# Patient Record
Sex: MALE | Race: WHITE | Employment: OTHER | URBAN - METROPOLITAN AREA
[De-identification: names, ages, dates, MRNs, and addresses within clinical notes are randomized per-mention and may not be internally consistent; named-entity substitution may affect disease eponyms.]

---

## 2022-08-02 ENCOUNTER — OFFICE VISIT (OUTPATIENT)
Dept: PRIMARY CARE CLINIC | Age: 52
End: 2022-08-02
Payer: MEDICAID

## 2022-08-02 VITALS
HEIGHT: 74 IN | HEART RATE: 83 BPM | DIASTOLIC BLOOD PRESSURE: 82 MMHG | SYSTOLIC BLOOD PRESSURE: 126 MMHG | OXYGEN SATURATION: 98 % | BODY MASS INDEX: 35.24 KG/M2 | WEIGHT: 274.6 LBS

## 2022-08-02 DIAGNOSIS — F41.9 ANXIETY: ICD-10-CM

## 2022-08-02 DIAGNOSIS — Z12.5 SCREENING FOR PROSTATE CANCER: ICD-10-CM

## 2022-08-02 DIAGNOSIS — E11.8 CONTROLLED TYPE 2 DIABETES MELLITUS WITH COMPLICATION, WITHOUT LONG-TERM CURRENT USE OF INSULIN (HCC): Primary | ICD-10-CM

## 2022-08-02 DIAGNOSIS — M54.12 CERVICAL RADICULOPATHY: ICD-10-CM

## 2022-08-02 DIAGNOSIS — M51.26 HERNIATED LUMBAR INTERVERTEBRAL DISC: ICD-10-CM

## 2022-08-02 DIAGNOSIS — I10 ESSENTIAL (PRIMARY) HYPERTENSION: ICD-10-CM

## 2022-08-02 DIAGNOSIS — Z12.11 SCREENING FOR MALIGNANT NEOPLASM OF COLON: ICD-10-CM

## 2022-08-02 DIAGNOSIS — G47.33 OSA (OBSTRUCTIVE SLEEP APNEA): ICD-10-CM

## 2022-08-02 LAB — HBA1C MFR BLD: 5.6 %

## 2022-08-02 PROCEDURE — 3044F HG A1C LEVEL LT 7.0%: CPT | Performed by: PHYSICIAN ASSISTANT

## 2022-08-02 PROCEDURE — 99204 OFFICE O/P NEW MOD 45 MIN: CPT | Performed by: PHYSICIAN ASSISTANT

## 2022-08-02 PROCEDURE — 83036 HEMOGLOBIN GLYCOSYLATED A1C: CPT | Performed by: PHYSICIAN ASSISTANT

## 2022-08-02 RX ORDER — AMLODIPINE BESYLATE 5 MG/1
10 TABLET ORAL DAILY
Qty: 30 TABLET | Refills: 2 | Status: SHIPPED | OUTPATIENT
Start: 2022-08-02 | End: 2022-08-02

## 2022-08-02 RX ORDER — LISINOPRIL 20 MG/1
20 TABLET ORAL DAILY
COMMUNITY
End: 2022-08-02 | Stop reason: SDUPTHER

## 2022-08-02 RX ORDER — CITALOPRAM 20 MG/1
20 TABLET ORAL DAILY
Qty: 30 TABLET | Refills: 2 | Status: SHIPPED | OUTPATIENT
Start: 2022-08-02 | End: 2022-08-02

## 2022-08-02 RX ORDER — CITALOPRAM 20 MG/1
20 TABLET ORAL DAILY
COMMUNITY
End: 2022-08-02 | Stop reason: SDUPTHER

## 2022-08-02 RX ORDER — AMLODIPINE BESYLATE 5 MG/1
5 TABLET ORAL DAILY
COMMUNITY
End: 2022-08-02 | Stop reason: SDUPTHER

## 2022-08-02 RX ORDER — AMLODIPINE BESYLATE 10 MG/1
10 TABLET ORAL DAILY
Qty: 30 TABLET | Refills: 1 | Status: SHIPPED | OUTPATIENT
Start: 2022-08-02 | End: 2022-09-27

## 2022-08-02 RX ORDER — LISINOPRIL 20 MG/1
20 TABLET ORAL DAILY
Qty: 30 TABLET | Refills: 1 | Status: SHIPPED | OUTPATIENT
Start: 2022-08-02 | End: 2022-09-27

## 2022-08-02 RX ORDER — CITALOPRAM 40 MG/1
40 TABLET ORAL DAILY
Qty: 30 TABLET | Refills: 1 | Status: SHIPPED | OUTPATIENT
Start: 2022-08-02 | End: 2022-09-27

## 2022-08-02 SDOH — ECONOMIC STABILITY: FOOD INSECURITY: WITHIN THE PAST 12 MONTHS, YOU WORRIED THAT YOUR FOOD WOULD RUN OUT BEFORE YOU GOT MONEY TO BUY MORE.: NEVER TRUE

## 2022-08-02 SDOH — ECONOMIC STABILITY: FOOD INSECURITY: WITHIN THE PAST 12 MONTHS, THE FOOD YOU BOUGHT JUST DIDN'T LAST AND YOU DIDN'T HAVE MONEY TO GET MORE.: NEVER TRUE

## 2022-08-02 ASSESSMENT — ENCOUNTER SYMPTOMS
EYE DISCHARGE: 0
DIARRHEA: 0
BACK PAIN: 1
RHINORRHEA: 0
SINUS PRESSURE: 0
COUGH: 0
SORE THROAT: 0
SHORTNESS OF BREATH: 0
PHOTOPHOBIA: 0
VOMITING: 0
CHEST TIGHTNESS: 0
CONSTIPATION: 0
ABDOMINAL PAIN: 0
ABDOMINAL DISTENTION: 0

## 2022-08-02 ASSESSMENT — PATIENT HEALTH QUESTIONNAIRE - PHQ9
SUM OF ALL RESPONSES TO PHQ9 QUESTIONS 1 & 2: 0
SUM OF ALL RESPONSES TO PHQ QUESTIONS 1-9: 0
1. LITTLE INTEREST OR PLEASURE IN DOING THINGS: 0
SUM OF ALL RESPONSES TO PHQ QUESTIONS 1-9: 0
SUM OF ALL RESPONSES TO PHQ QUESTIONS 1-9: 0
2. FEELING DOWN, DEPRESSED OR HOPELESS: 0
SUM OF ALL RESPONSES TO PHQ QUESTIONS 1-9: 0

## 2022-08-02 ASSESSMENT — SOCIAL DETERMINANTS OF HEALTH (SDOH): HOW HARD IS IT FOR YOU TO PAY FOR THE VERY BASICS LIKE FOOD, HOUSING, MEDICAL CARE, AND HEATING?: NOT HARD AT ALL

## 2022-08-02 NOTE — PROGRESS NOTES
[facility] PRIMARY CARE  [address] [facility] [place] [address]  Dept: [phone]    Damon Anand is a 47 y.o. male new patient who presents today for his medical conditions/complaints as noted below. Chief Complaint   Patient presents with    New Patient    Back Pain       HPI:     Back Pain  Pertinent negatives include no abdominal pain, chest pain, fever, headaches, numbness or weakness. The patient is diagnosed with Diabetes using metformin and had a1c around 6. Has had knee replaced on right side. Left knee supposed to be replaced. Did MRI of spine wanted surgery on spinal lesion in cervical spine. Chronic pain with sciatica. Was on pain medications which did not help there. Weaned off since march 14th of last year. Reviewed prior notes None  Reviewed previous Labs    No results found for: LDLCHOLESTEROL, LDLCALC    (goal LDL is <100)   Hemoglobin A1C (%)   Date Value   08/02/2022 5.6     BP Readings from Last 3 Encounters:   08/02/22 (!) 146/100          (goal 120/80)    No past medical history on file. No past surgical history on file. No family history on file. Social History     Tobacco Use    Smoking status: Not on file    Smokeless tobacco: Not on file   Substance Use Topics    Alcohol use: Not on file      Current Outpatient Medications   Medication Sig Dispense Refill    metFORMIN (GLUCOPHAGE) 500 MG tablet Take 1 tablet by mouth in the morning and 1 tablet in the evening. Take with meals. 60 tablet 1    lisinopril (PRINIVIL;ZESTRIL) 20 MG tablet Take 1 tablet by mouth in the morning. 30 tablet 1    amLODIPine (NORVASC) 10 MG tablet Take 1 tablet by mouth in the morning. 30 tablet 1    citalopram (CELEXA) 40 MG tablet Take 1 tablet by mouth in the morning. 30 tablet 1     No current facility-administered medications for this visit.      No Known Allergies    Health Maintenance   Topic Date Due    COVID-19 Vaccine (1) Never done    HIV screen  Never done    Hepatitis C screen  Never done    DTaP/Tdap/Td vaccine (1 - Tdap) Never done    Lipids  Never done    Colorectal Cancer Screen  Never done    Shingles vaccine (1 of 2) Never done    Flu vaccine (1) 09/01/2022    Depression Screen  08/02/2023    Diabetes screen  08/02/2025    Hepatitis A vaccine  Aged Out    Hepatitis B vaccine  Aged Out    Hib vaccine  Aged Out    Meningococcal (ACWY) vaccine  Aged Out    Pneumococcal 0-64 years Vaccine  Aged Out       Subjective:      Review of Systems   Constitutional:  Negative for chills, fever and unexpected weight change. HENT:  Negative for congestion, hearing loss, rhinorrhea, sinus pressure and sore throat. Eyes:  Negative for photophobia, discharge and visual disturbance. Respiratory:  Negative for cough, chest tightness and shortness of breath. Cardiovascular:  Negative for chest pain, palpitations and leg swelling. Gastrointestinal:  Negative for abdominal distention, abdominal pain, constipation, diarrhea and vomiting. Endocrine: Negative for polydipsia and polyuria. Genitourinary:  Negative for decreased urine volume, difficulty urinating, frequency and urgency. Musculoskeletal:  Positive for back pain. Negative for arthralgias, gait problem and myalgias. Skin:  Negative for rash. Allergic/Immunologic: Negative for food allergies. Neurological:  Negative for dizziness, weakness, numbness and headaches. Hematological:  Negative for adenopathy. Psychiatric/Behavioral:  Positive for sleep disturbance. Negative for dysphoric mood. The patient is not nervous/anxious. Objective:     BP (!) 146/100   Pulse 93   Ht 6' 2.02\" (1.88 m)   Wt 274 lb 9.6 oz (124.6 kg)   SpO2 97%   BMI 35.24 kg/m²   Physical Exam  Constitutional:       General: He is not in acute distress. Appearance: Normal appearance. He is not ill-appearing. HENT:      Head: Normocephalic and atraumatic.       Right Ear: Tympanic membrane, ear canal and external ear normal.      Left Ear: Tympanic membrane, ear canal and external ear normal.      Nose: Nose normal.      Mouth/Throat:      Mouth: Mucous membranes are moist.   Eyes:      Extraocular Movements: Extraocular movements intact. Conjunctiva/sclera: Conjunctivae normal.      Pupils: Pupils are equal, round, and reactive to light. Neck:      Vascular: No carotid bruit. Cardiovascular:      Rate and Rhythm: Normal rate and regular rhythm. Pulses: Normal pulses. Heart sounds: Normal heart sounds. Pulmonary:      Effort: Pulmonary effort is normal. No respiratory distress. Breath sounds: Normal breath sounds. Abdominal:      General: Bowel sounds are normal. There is no distension. Tenderness: There is no abdominal tenderness. Musculoskeletal:         General: Normal range of motion. Cervical back: Normal range of motion and neck supple. Lymphadenopathy:      Cervical: No cervical adenopathy. Skin:     General: Skin is warm and dry. Neurological:      General: No focal deficit present. Mental Status: He is alert and oriented to person, place, and time. Psychiatric:         Mood and Affect: Mood normal.         Behavior: Behavior normal.         Thought Content: Thought content normal.       Assessment and Plan:          1. Controlled type 2 diabetes mellitus with complication, without long-term current use of insulin (HCC)  -     POCT glycosylated hemoglobin (Hb A1C)  -     CBC with Auto Differential; Future  -     Comprehensive Metabolic Panel; Future  -     Lipid, Fasting; Future  -     PSA Screening; Future  -     HIV Screen; Future  -     Hepatitis C Antibody; Future  -     Fecal DNA Colorectal cancer screening (Cologuard)  -     metFORMIN (GLUCOPHAGE) 500 MG tablet; Take 1 tablet by mouth in the morning and 1 tablet in the evening. Take with meals. , Disp-60 tablet, R-1Normal  2.  Screening for malignant neoplasm of colon  - Fecal DNA Colorectal cancer screening (Cologuard)  3. Screening for prostate cancer  -     PSA Screening; Future  4. Essential (primary) hypertension  -     lisinopril (PRINIVIL;ZESTRIL) 20 MG tablet; Take 1 tablet by mouth in the morning., Disp-30 tablet, R-1Normal  -     amLODIPine (NORVASC) 10 MG tablet; Take 1 tablet by mouth in the morning., Disp-30 tablet, R-1Normal  5. Anxiety  -     citalopram (CELEXA) 40 MG tablet; Take 1 tablet by mouth in the morning., Disp-30 tablet, R-1Normal  6. Herniated lumbar intervertebral disc  -     [facility] - Harjinder Beverage, DO, Neurosurgery, [place]  7. Cervical radiculopathy  -     [facility] - Harjinder Beverage, DO, Neurosurgery, [place]  8. ZEFERINO (obstructive sleep apnea)           Patient given educational materials - see patient instructions. Discussed use, benefit, and side effects of prescribed medications. All patient questions answered. Pt voiced understanding. Reviewed health maintenance. Instructed to continue current medications, diet and exercise. Patient agreed with treatment plan. Follow up as directed.      Electronically signed by SAMANTA Ozuna on 8/2/2022 at 10:56 AM

## 2022-08-11 ENCOUNTER — OFFICE VISIT (OUTPATIENT)
Dept: NEUROSURGERY | Age: 52
End: 2022-08-11
Payer: MEDICAID

## 2022-08-11 VITALS
DIASTOLIC BLOOD PRESSURE: 87 MMHG | OXYGEN SATURATION: 95 % | HEIGHT: 74 IN | WEIGHT: 271 LBS | HEART RATE: 77 BPM | BODY MASS INDEX: 34.78 KG/M2 | TEMPERATURE: 98.6 F | RESPIRATION RATE: 20 BRPM | SYSTOLIC BLOOD PRESSURE: 132 MMHG

## 2022-08-11 DIAGNOSIS — M48.04 THORACIC STENOSIS: ICD-10-CM

## 2022-08-11 DIAGNOSIS — G95.89 MYELOMALACIA (HCC): Primary | ICD-10-CM

## 2022-08-11 PROCEDURE — 99203 OFFICE O/P NEW LOW 30 MIN: CPT | Performed by: NURSE PRACTITIONER

## 2022-08-11 RX ORDER — LOPERAMIDE HYDROCHLORIDE 2 MG/1
2 CAPSULE ORAL 2 TIMES DAILY
COMMUNITY
End: 2022-08-12 | Stop reason: SDUPTHER

## 2022-08-11 NOTE — PROGRESS NOTES
[address] [facility] NEUROSURGERY  [address] [facility] [address]  Dept: [phone]    Patient:  Kelly Schneider  YOB: 1970  Date: 8/11/22    The patient is a 47 y.o. male who presents today for consult of the following problems:     Chief Complaint   Patient presents with    New Patient         HPI:     Kelly Schneider is a 47 y.o. male on whom neurosurgical consultation was requested by SAMANTA Weber for management of abnormal thoracic imaging. Patient recently moved to this area, was previously living near [facility]. Reports that he had thoracic imaging complete approximately 1 year ago, did show abnormalities for which he was sent to a surgeon for evaluation. Advised that he required surgical intervention, and recommended that it be complete within 6 months. Patient then moved to the area and is now reestablishing. Does have varying degrees of neck and lower back pain. Reports that this is been an chronic, ongoing issue for him. Reports some intermittent numbness and tingling to right upper extremity, this is only been present for the last 2 weeks, and does seem to be slowly improving. Denies any consistent to lower extremity numbness. Denies saddle anesthesia, loss of bowel or bladder function. Does report that he sometimes has difficulty with his legs, left leg will occasionally give out. Is due to have left knee surgery, but does not know that he will go through it after having the right one complete. Has been treated with various medications in the past including pain medication, which she did not find to be particularly helpful and weaned himself off. Utilizes marijuana to manage his symptoms currently. Denies any numbness or tingling to chest or back. History:     History reviewed. No pertinent past medical history. History reviewed.  No pertinent surgical history. History reviewed. No pertinent family history. Current Outpatient Medications on File Prior to Visit   Medication Sig Dispense Refill    metFORMIN (GLUCOPHAGE) 500 MG tablet Take 1 tablet by mouth in the morning and 1 tablet in the evening. Take with meals. 60 tablet 1    lisinopril (PRINIVIL;ZESTRIL) 20 MG tablet Take 1 tablet by mouth in the morning. 30 tablet 1    amLODIPine (NORVASC) 10 MG tablet Take 1 tablet by mouth in the morning. 30 tablet 1    citalopram (CELEXA) 40 MG tablet Take 1 tablet by mouth in the morning. 30 tablet 1     No current facility-administered medications on file prior to visit. Social History     Tobacco Use    Smoking status: Former     Types: Cigarettes     Quit date:      Years since quittin.6    Smokeless tobacco: Never   Substance Use Topics    Alcohol use: Yes     Comment: socially    Drug use: Yes     Types: Marijuana (Weed)       No Known Allergies    Review of Systems  Constitutional: Negative for activity change and appetite change. HENT: Negative for ear pain and facial swelling. Eyes: Negative for discharge and itching. Respiratory: Negative for choking and chest tightness. Cardiovascular: Negative for chest pain and leg swelling. Gastrointestinal: Negative for nausea and abdominal pain. Endocrine: Negative for cold intolerance and heat intolerance. Genitourinary: Negative for frequency and flank pain. Musculoskeletal: Negative for myalgias and joint swelling. Skin: Negative for rash and wound. Allergic/Immunologic: Negative for environmental allergies and food allergies. Hematological: Negative for adenopathy. Does not bruise/bleed easily. Psychiatric/Behavioral: Negative for self-injury. The patient is not nervous/anxious.       Physical Exam:      /87 (Site: Left Upper Arm, Position: Sitting, Cuff Size: Large Adult)   Pulse 77   Temp 98.6 °F (37 °C) (Oral)   Resp 20   Ht 6' 2\" (1.88 m)   Wt 271 lb (122.9 kg) SpO2 95%   BMI 34.79 kg/m²   Estimated body mass index is 34.79 kg/m² as calculated from the following:    Height as of this encounter: 6' 2\" (1.88 m). Weight as of this encounter: 271 lb (122.9 kg). General:  Jack Copeland is a 47y.o. year old male who appears his stated age. HEENT: Normocephalic atraumatic. Neck supple. Chest: regular rate; pulses equal  Abdomen: Soft nontender nondistended. Ext: DP and PT pulses 2+, good cap refill  Neuro    Mentation  Appropriate affect  Registration intact  Orientation intact  Judgement intact to situation    Cranial Nerves:   Pupils equal and reactive to light  Extraocular motion intact  Face and shrug symmetric  Tongue midline  No dysarthria  v1-3 sensation symmetric, masseter tone symmetric  Hearing symmetric    Sensation: Intact    Motor  L deltoid 5/5; R deltoid 5/5  L biceps 5/5; R biceps 5/5  L triceps 5/5; R triceps 5/5  L wrist extension 5/5; R wrist extension 5/5  L intrinsics 5/5; R intrinsics 5/5     L iliopsoas 5/5 , R iliopsoas 5/5  L quadriceps 5/5; R quadriceps 5/5  L Dorsiflexion 5/5; R dorsiflexion 5/5  L Plantarflexion 5/5; R plantarflexion 5/5  L EHL 5/5; R EHL 5/5    Reflexes  L Brachioradialis 2+/4; R brachioradialis 2+/4  L Biceps 2+/4; R Biceps 2+/4  L Triceps 2+/4; R Triceps 2+/4  L Patellar 2+/4: R Patellar 2+/4  L Achilles 2+/4; R Achilles 2+/4    hoffmans L: neg  hoffmans R: neg  Clonus L: neg  Clonus R: neg  Babinski L: neg  Babinski R: neg    Studies Review:     7/29/2021:        Assessment and Plan:      1. Myelomalacia ([facility] [address]    2. Thoracic stenosis          Plan: Thoracic MRI report from more than a year ago available, suggests significant central stenosis at T 2-T3 level with corresponding cord signal change. Patient without hyperreflexia, clonus or decreased strength/sensation to lower extremities at present. Recommend obtaining updated thoracic MRI for further evaluation of any progressive cord compression.   Patient to return for follow-up visit with Dr. Algis Ahumada after updated imaging. We will attempt to obtain old images for comparison. Followup: Return in about 4 weeks (around 9/8/2022), or if symptoms worsen or fail to improve. Prescriptions Ordered:  No orders of the defined types were placed in this encounter. Orders Placed:  Orders Placed This Encounter   Procedures    MRI THORACIC SPINE WO CONTRAST     Standing Status:   Future     Standing Expiration Date:   8/11/2023     Order Specific Question:   Reason for exam:     Answer:   eval for progression of cord compression/myelomalacia     Order Specific Question:   What is the sedation requirement? Answer:   None          Electronically signed by COLTON Smith CNP on 8/11/2022 at 12:38 PM    Please note that this chart was generated using voice recognition Dragon dictation software. Although every effort was made to ensure the accuracy of this automated transcription, some errors in transcription may have occurred.

## 2022-08-11 NOTE — TELEPHONE ENCOUNTER
LAST VISIT:   Visit date not found     Future Appointments   Date Time Provider Anibal Witt   8/11/2022 12:00 PM COLTON Shelton - CNP  [facility]UR[facility]   11/2/2022 10:00 AM SAMANTA [facility] [address] [facility]

## 2022-08-12 RX ORDER — LOPERAMIDE HYDROCHLORIDE 2 MG/1
2 CAPSULE ORAL 2 TIMES DAILY PRN
Qty: 60 CAPSULE | Refills: 0 | Status: SHIPPED | OUTPATIENT
Start: 2022-08-12

## 2022-08-16 LAB
ALBUMIN SERPL-MCNC: NORMAL G/DL
ALP BLD-CCNC: NORMAL U/L
ALT SERPL-CCNC: NORMAL U/L
ANION GAP SERPL CALCULATED.3IONS-SCNC: NORMAL MMOL/L
ANTIBODY: NORMAL
AST SERPL-CCNC: NORMAL U/L
BASOPHILS ABSOLUTE: NORMAL
BASOPHILS RELATIVE PERCENT: NORMAL
BILIRUB SERPL-MCNC: NORMAL MG/DL
BUN BLDV-MCNC: NORMAL MG/DL
CALCIUM SERPL-MCNC: NORMAL MG/DL
CHLORIDE BLD-SCNC: NORMAL MMOL/L
CHOLESTEROL, FASTING: 212
CO2: NORMAL
CREAT SERPL-MCNC: NORMAL MG/DL
EOSINOPHILS ABSOLUTE: NORMAL
EOSINOPHILS RELATIVE PERCENT: NORMAL
GFR CALCULATED: NORMAL
GLUCOSE BLD-MCNC: 112 MG/DL
HCT VFR BLD CALC: NORMAL %
HDLC SERPL-MCNC: 36 MG/DL (ref 35–70)
HEMOGLOBIN: NORMAL
HIV AG/AB: NORMAL
LDL CHOLESTEROL CALCULATED: 154 MG/DL (ref 0–160)
LYMPHOCYTES ABSOLUTE: NORMAL
LYMPHOCYTES RELATIVE PERCENT: NORMAL
MCH RBC QN AUTO: NORMAL PG
MCHC RBC AUTO-ENTMCNC: NORMAL G/DL
MCV RBC AUTO: NORMAL FL
MONOCYTES ABSOLUTE: NORMAL
MONOCYTES RELATIVE PERCENT: NORMAL
NEUTROPHILS ABSOLUTE: NORMAL
NEUTROPHILS RELATIVE PERCENT: NORMAL
PDW BLD-RTO: NORMAL %
PLATELET # BLD: NORMAL 10*3/UL
PMV BLD AUTO: NORMAL FL
POTASSIUM SERPL-SCNC: NORMAL MMOL/L
PROSTATE SPECIFIC ANTIGEN: NORMAL
RBC # BLD: NORMAL 10*6/UL
SODIUM BLD-SCNC: NORMAL MMOL/L
TOTAL PROTEIN: NORMAL
TRIGLYCERIDE, FASTING: 109
WBC # BLD: NORMAL 10*3/UL

## 2022-08-20 ENCOUNTER — HOSPITAL ENCOUNTER (OUTPATIENT)
Dept: MRI IMAGING | Age: 52
Discharge: HOME | End: 2022-08-22
Payer: MEDICAID

## 2022-08-20 DIAGNOSIS — G95.89 MYELOMALACIA (HCC): ICD-10-CM

## 2022-08-20 DIAGNOSIS — M48.04 THORACIC STENOSIS: ICD-10-CM

## 2022-08-20 PROCEDURE — 72146 MRI CHEST SPINE W/O DYE: CPT

## 2022-08-25 DIAGNOSIS — Z12.5 SCREENING FOR PROSTATE CANCER: ICD-10-CM

## 2022-08-25 DIAGNOSIS — E11.8 CONTROLLED TYPE 2 DIABETES MELLITUS WITH COMPLICATION, WITHOUT LONG-TERM CURRENT USE OF INSULIN (HCC): ICD-10-CM

## 2022-08-26 RX ORDER — ATORVASTATIN CALCIUM 20 MG/1
20 TABLET, FILM COATED ORAL DAILY
Qty: 30 TABLET | Refills: 0 | Status: SHIPPED | OUTPATIENT
Start: 2022-08-26 | End: 2022-09-28

## 2022-08-26 NOTE — RESULT ENCOUNTER NOTE
Call patient and advise that test results showed slightly elevated cholesterol. Patient should be treated with a statin for this. Okay to have patient come in to discuss further if he would like otherwise I can just send in medication for patient to begin.

## 2022-09-07 LAB — NONINV COLON CA DNA+OCC BLD SCRN STL QL: NEGATIVE

## 2022-09-20 ENCOUNTER — OFFICE VISIT (OUTPATIENT)
Dept: NEUROSURGERY | Age: 52
End: 2022-09-20
Payer: MEDICAID

## 2022-09-20 VITALS
HEIGHT: 75 IN | BODY MASS INDEX: 33.69 KG/M2 | TEMPERATURE: 97.3 F | DIASTOLIC BLOOD PRESSURE: 81 MMHG | SYSTOLIC BLOOD PRESSURE: 131 MMHG | RESPIRATION RATE: 18 BRPM | OXYGEN SATURATION: 94 % | WEIGHT: 271 LBS | HEART RATE: 72 BPM

## 2022-09-20 DIAGNOSIS — G95.9 CERVICAL MYELOPATHY (HCC): Primary | ICD-10-CM

## 2022-09-20 DIAGNOSIS — M48.04 THORACIC STENOSIS: ICD-10-CM

## 2022-09-20 PROCEDURE — 99214 OFFICE O/P EST MOD 30 MIN: CPT | Performed by: NEUROLOGICAL SURGERY

## 2022-09-20 NOTE — PROGRESS NOTES
Activity    Alcohol use: Yes     Comment: socially    Drug use: Yes     Types: Marijuana Ellen Gabriela)    Sexual activity: Not on file   Other Topics Concern    Not on file   Social History Narrative    Not on file     Social Determinants of Health     Financial Resource Strain: Low Risk     Difficulty of Paying Living Expenses: Not hard at all   Food Insecurity: No Food Insecurity    Worried About Running Out of Food in the Last Year: Never true    Ran Out of Food in the Last Year: Never true   Transportation Needs: Not on file   Physical Activity: Not on file   Stress: Not on file   Social Connections: Not on file   Intimate Partner Violence: Not on file   Housing Stability: Not on file       Family History:   No family history on file. Allergies:  Patient has no known allergies. Home Medications:  Prior to Admission medications    Medication Sig Start Date End Date Taking? Authorizing Provider   loperamide (IMODIUM) 2 MG capsule Take 1 capsule by mouth 2 times daily as needed for Diarrhea 8/12/22  Yes SAMANTA Powers   atorvastatin (LIPITOR) 20 MG tablet TAKE 1 TABLET BY MOUTH EVERY DAY 9/28/22   SAMANTA Powers   amLODIPine (NORVASC) 10 MG tablet TAKE 1 TABLET BY MOUTH EVERY DAY IN THE MORNING 9/27/22   SAMANTA Powers   lisinopril (PRINIVIL;ZESTRIL) 20 MG tablet TAKE 1 TABLET BY MOUTH EVERY DAY IN THE MORNING 9/27/22   SAMANTA Powers   citalopram (CELEXA) 40 MG tablet TAKE 1 TABLET BY MOUTH EVERY DAY IN THE MORNING 9/27/22   SAMANTA Powers   metFORMIN (GLUCOPHAGE) 500 MG tablet TAKE 1 TABLET BY MOUTH IN THE MORNING AND 1 TABLET IN THE EVENING. TAKE WITH MEALS. 9/27/22   SAMANTA Powers       Current Medications:   No current facility-administered medications for this visit.       PHYSICAL EXAM:       /81 (Site: Right Upper Arm, Position: Sitting, Cuff Size: Large Adult)   Pulse 72   Temp 97.3 °F (36.3 °C) (Temporal)   Resp 18   Ht 6' 3\" (1.905 m)   Wt 271 lb (122.9 kg)   SpO2 94%   BMI 33.87 kg/m²   Physical Exam     Gen: NAD  HEENT: moist mucus membranes  Cardio: RRR  Pulm: chest rise symmetrically  GI: abd soft  Ext: no edema  Skin: warm    Neuro:    AOX3  CN 2-12 grossly intact  Speech articulate  Motor 5/5  No pronator drift  Sensation symmetrical   No hyperreflexia  No granado sign        Radiology Review:    MRI thoracic spine WO contrast  8/20/2022  Official read:  Multilevel degenerative disc disease with associated multilevel degenerative  facet hypertrophy resulting in canal stenosis at multiple levels in the upper  and midthoracic spine as described above and this is most severe at T2-3. Foraminal narrowing bilaterally as described above. My read:      ASSESSMENT AND PLAN:       Patient Active Problem List   Diagnosis    Controlled type 2 diabetes mellitus with complication, without long-term current use of insulin (HCC)    Essential (primary) hypertension    Anxiety    ZEFERINO (obstructive sleep apnea)    Cervical radiculopathy    Herniated lumbar intervertebral disc    Thoracic stenosis    Cervical myelopathy (HCC)         A/P:  This is a 47 y.o. male with Cervical myelopathy ([address]  -     MRI CERVICAL SPINE [address]; Future  Thoracic stenosis     I thoroughly discussed details of diagnosis, natural histories of disease, and treatment options. We discussed surgical and non-surgical options. I detailed the benefits, risks, recovery period, and alternatives of surgery. I used the imaging to depict the surgery and diagnosis to the patient. Follow up in 2 months with cervical spine MRI. Patient and/or family was counseled on the diagnosis and treatment plan    By signing my name below, I, Cheryl Gurerero, attest that this documentation has been prepared under the direction and in the presence of Freda Mcmanus DO. Electronically signed: John Victoria, 9/20/22         This note was created using voice recognition software.  There may be inaccuracies of

## 2022-09-26 ENCOUNTER — HOSPITAL ENCOUNTER (OUTPATIENT)
Dept: MRI IMAGING | Facility: CLINIC | Age: 52
Discharge: HOME | End: 2022-09-28
Payer: MEDICAID

## 2022-09-26 DIAGNOSIS — G95.9 CERVICAL MYELOPATHY (HCC): ICD-10-CM

## 2022-09-26 DIAGNOSIS — I10 ESSENTIAL (PRIMARY) HYPERTENSION: ICD-10-CM

## 2022-09-26 PROCEDURE — 72141 MRI NECK SPINE W/O DYE: CPT

## 2022-09-27 DIAGNOSIS — E11.8 CONTROLLED TYPE 2 DIABETES MELLITUS WITH COMPLICATION, WITHOUT LONG-TERM CURRENT USE OF INSULIN (HCC): ICD-10-CM

## 2022-09-27 DIAGNOSIS — F41.9 ANXIETY: ICD-10-CM

## 2022-09-27 RX ORDER — AMLODIPINE BESYLATE 10 MG/1
TABLET ORAL
Qty: 30 TABLET | Refills: 1 | Status: SHIPPED | OUTPATIENT
Start: 2022-09-27

## 2022-09-27 RX ORDER — CITALOPRAM 40 MG/1
TABLET ORAL
Qty: 30 TABLET | Refills: 1 | Status: SHIPPED | OUTPATIENT
Start: 2022-09-27

## 2022-09-27 RX ORDER — LISINOPRIL 20 MG/1
TABLET ORAL
Qty: 30 TABLET | Refills: 1 | Status: SHIPPED | OUTPATIENT
Start: 2022-09-27

## 2022-09-28 DIAGNOSIS — E11.8 CONTROLLED TYPE 2 DIABETES MELLITUS WITH COMPLICATION, WITHOUT LONG-TERM CURRENT USE OF INSULIN (HCC): ICD-10-CM

## 2022-09-28 RX ORDER — ATORVASTATIN CALCIUM 20 MG/1
TABLET, FILM COATED ORAL
Qty: 30 TABLET | Refills: 0 | Status: SHIPPED | OUTPATIENT
Start: 2022-09-28 | End: 2022-10-31

## 2022-10-30 DIAGNOSIS — E11.8 CONTROLLED TYPE 2 DIABETES MELLITUS WITH COMPLICATION, WITHOUT LONG-TERM CURRENT USE OF INSULIN (HCC): ICD-10-CM

## 2022-10-31 RX ORDER — ATORVASTATIN CALCIUM 20 MG/1
TABLET, FILM COATED ORAL
Qty: 30 TABLET | Refills: 0 | Status: SHIPPED | OUTPATIENT
Start: 2022-10-31 | End: 2022-11-30

## 2022-11-02 ENCOUNTER — OFFICE VISIT (OUTPATIENT)
Dept: PRIMARY CARE CLINIC | Age: 52
End: 2022-11-02
Payer: MEDICAID

## 2022-11-02 VITALS
WEIGHT: 271 LBS | BODY MASS INDEX: 33.69 KG/M2 | OXYGEN SATURATION: 98 % | SYSTOLIC BLOOD PRESSURE: 118 MMHG | HEART RATE: 64 BPM | DIASTOLIC BLOOD PRESSURE: 82 MMHG | HEIGHT: 75 IN

## 2022-11-02 DIAGNOSIS — G95.9 CERVICAL MYELOPATHY (HCC): ICD-10-CM

## 2022-11-02 DIAGNOSIS — E11.8 CONTROLLED TYPE 2 DIABETES MELLITUS WITH COMPLICATION, WITHOUT LONG-TERM CURRENT USE OF INSULIN (HCC): Primary | ICD-10-CM

## 2022-11-02 DIAGNOSIS — F32.A DEPRESSION, UNSPECIFIED DEPRESSION TYPE: ICD-10-CM

## 2022-11-02 DIAGNOSIS — G47.33 OSA (OBSTRUCTIVE SLEEP APNEA): ICD-10-CM

## 2022-11-02 DIAGNOSIS — Z78.9 HEALTH MAINTENANCE ALTERATION: ICD-10-CM

## 2022-11-02 DIAGNOSIS — J44.9 CHRONIC OBSTRUCTIVE PULMONARY DISEASE, UNSPECIFIED COPD TYPE (HCC): ICD-10-CM

## 2022-11-02 DIAGNOSIS — Z23 NEED FOR VACCINATION: ICD-10-CM

## 2022-11-02 DIAGNOSIS — I10 ESSENTIAL (PRIMARY) HYPERTENSION: ICD-10-CM

## 2022-11-02 LAB
CREATININE URINE POCT: NORMAL
HBA1C MFR BLD: 5.4 %
MICROALBUMIN/CREAT 24H UR: NORMAL MG/G{CREAT}
MICROALBUMIN/CREAT UR-RTO: NORMAL

## 2022-11-02 PROCEDURE — 83036 HEMOGLOBIN GLYCOSYLATED A1C: CPT | Performed by: PHYSICIAN ASSISTANT

## 2022-11-02 PROCEDURE — 3074F SYST BP LT 130 MM HG: CPT | Performed by: PHYSICIAN ASSISTANT

## 2022-11-02 PROCEDURE — 90674 CCIIV4 VAC NO PRSV 0.5 ML IM: CPT | Performed by: PHYSICIAN ASSISTANT

## 2022-11-02 PROCEDURE — 90472 IMMUNIZATION ADMIN EACH ADD: CPT | Performed by: PHYSICIAN ASSISTANT

## 2022-11-02 PROCEDURE — 3079F DIAST BP 80-89 MM HG: CPT | Performed by: PHYSICIAN ASSISTANT

## 2022-11-02 PROCEDURE — 3044F HG A1C LEVEL LT 7.0%: CPT | Performed by: PHYSICIAN ASSISTANT

## 2022-11-02 PROCEDURE — 90471 IMMUNIZATION ADMIN: CPT | Performed by: PHYSICIAN ASSISTANT

## 2022-11-02 PROCEDURE — 90750 HZV VACC RECOMBINANT IM: CPT | Performed by: PHYSICIAN ASSISTANT

## 2022-11-02 PROCEDURE — 99214 OFFICE O/P EST MOD 30 MIN: CPT | Performed by: PHYSICIAN ASSISTANT

## 2022-11-02 PROCEDURE — 82044 UR ALBUMIN SEMIQUANTITATIVE: CPT | Performed by: PHYSICIAN ASSISTANT

## 2022-11-02 RX ORDER — BUPROPION HYDROCHLORIDE 150 MG/1
150 TABLET ORAL EVERY MORNING
Qty: 30 TABLET | Refills: 3 | Status: SHIPPED | OUTPATIENT
Start: 2022-11-02

## 2022-11-02 ASSESSMENT — ENCOUNTER SYMPTOMS
SORE THROAT: 0
EYE DISCHARGE: 0
ABDOMINAL DISTENTION: 0
CONSTIPATION: 0
ABDOMINAL PAIN: 0
SINUS PRESSURE: 0
VOMITING: 0
DIARRHEA: 0
COUGH: 0
RHINORRHEA: 0
PHOTOPHOBIA: 0
CHEST TIGHTNESS: 0
SHORTNESS OF BREATH: 1

## 2022-11-02 NOTE — PROGRESS NOTES
[address] [facility]  [address]  [facility] [address]  Dept: [phone]    Reji Tellez is a 47 y.o. male Established patient, who presents today for his medical conditions/complaints as noted below. Chief Complaint   Patient presents with    Diabetes     Patient is here today for DM follow up and medication check. Patient states no other concerns at this time. HPI:     HPI: The patient is here for diabetes follow up. A1c is 5.4 today. Not checking sugars at home. Has not seen eye doctor yet had to cancel appointment. Depression not getting better. He will feel good for weeks then down for weeks. Ow motivation some crying spells. For his neck he has been getting MRI's  following up with neurosurgery for     Had been diagnosed with COPD doing practices with meditation and diaphragmatic breathing. correction of this. Reviewed prior notes None  Reviewed previous Labs    LDL Calculated (mg/dL)   Date Value   2022 154       (goal LDL is <100)   Hemoglobin A1C (%)   Date Value   2022 5.4     BP Readings from Last 3 Encounters:   22 118/82   22 131/81   22 132/87          (goal 120/80)  Hemoglobin A1C   Date Value Ref Range Status   2022 5.4 % Final     History reviewed. No pertinent past medical history. No past surgical history on file. No family history on file.     Social History     Tobacco Use    Smoking status: Former     Packs/day: 0.50     Years: 10.00     Pack years: 5.00     Types: Cigarettes     Quit date:      Years since quittin.8    Smokeless tobacco: Never   Substance Use Topics    Alcohol use: Yes     Comment: socially      Current Outpatient Medications   Medication Sig Dispense Refill    buPROPion (WELLBUTRIN XL) 150 MG extended release tablet Take 1 tablet by mouth every morning 30 tablet 3    atorvastatin (LIPITOR) 20 MG tablet TAKE 1 TABLET BY MOUTH EVERY DAY 30 tablet 0    amLODIPine (NORVASC) 10 MG tablet TAKE 1 TABLET BY MOUTH EVERY DAY IN THE MORNING 30 tablet 1    lisinopril (PRINIVIL;ZESTRIL) 20 MG tablet TAKE 1 TABLET BY MOUTH EVERY DAY IN THE MORNING 30 tablet 1    citalopram (CELEXA) 40 MG tablet TAKE 1 TABLET BY MOUTH EVERY DAY IN THE MORNING 30 tablet 1    metFORMIN (GLUCOPHAGE) 500 MG tablet TAKE 1 TABLET BY MOUTH IN THE MORNING AND 1 TABLET IN THE EVENING. TAKE WITH MEALS. 60 tablet 1    loperamide (IMODIUM) 2 MG capsule Take 1 capsule by mouth 2 times daily as needed for Diarrhea 60 capsule 0     No current facility-administered medications for this visit. No Known Allergies    Health Maintenance   Topic Date Due    HIV screen  Never done    Diabetic microalbuminuria test  Never done    Diabetic retinal exam  Never done    Shingles vaccine (1 of 2) Never done    Flu vaccine (1) Never done    Hepatitis B vaccine (1 of 3 - Risk 3-dose series) 11/02/2023 (Originally 2/13/1989)    DTaP/Tdap/Td vaccine (1 - Tdap) 11/02/2023 (Originally 2/13/1989)    Pneumococcal 0-64 years Vaccine (1 - PCV) 11/02/2023 (Originally 2/13/1976)    COVID-19 Vaccine (1) 11/02/2023 (Originally 1970)    Depression Screen  08/02/2023    Lipids  08/16/2023    Diabetic foot exam  11/02/2023    A1C test (Diabetic or Prediabetic)  11/02/2023    Colorectal Cancer Screen  09/01/2025    Hepatitis C screen  Completed    Hepatitis A vaccine  Aged Out    Hib vaccine  Aged Out    Meningococcal (ACWY) vaccine  Aged Out       Subjective:      Review of Systems   Constitutional:  Negative for chills, fever and unexpected weight change. HENT:  Negative for congestion, hearing loss, rhinorrhea, sinus pressure and sore throat. Eyes:  Negative for photophobia, discharge and visual disturbance. Respiratory:  Positive for shortness of breath. Negative for cough and chest tightness. Cardiovascular:  Negative for chest pain, palpitations and leg swelling.    Gastrointestinal:  Negative for abdominal distention, abdominal pain, constipation, diarrhea and vomiting. Endocrine: Negative for polydipsia and polyuria. Genitourinary:  Negative for decreased urine volume, difficulty urinating, frequency and urgency. Musculoskeletal:  Positive for neck pain. Negative for arthralgias, gait problem and myalgias. Skin:  Negative for rash. Allergic/Immunologic: Negative for food allergies. Neurological:  Positive for numbness (both arms at times comes and goes. ). Negative for dizziness, weakness and headaches. Hematological:  Negative for adenopathy. Psychiatric/Behavioral:  Positive for dysphoric mood. Negative for sleep disturbance. The patient is not nervous/anxious. Objective:     /82   Pulse 64   Ht 6' 3\" (1.905 m)   Wt 271 lb (122.9 kg)   SpO2 98%   BMI 33.87 kg/m²   Physical Exam  Constitutional:       General: He is not in acute distress. Appearance: Normal appearance. He is not ill-appearing. HENT:      Head: Normocephalic and atraumatic. Right Ear: External ear normal.      Left Ear: External ear normal.      Nose: Nose normal.      Mouth/Throat:      Mouth: Mucous membranes are moist.   Eyes:      Extraocular Movements: Extraocular movements intact. Conjunctiva/sclera: Conjunctivae normal.      Pupils: Pupils are equal, round, and reactive to light. Neck:      Vascular: No carotid bruit. Cardiovascular:      Rate and Rhythm: Normal rate and regular rhythm. Pulses: Normal pulses. Heart sounds: Normal heart sounds. Pulmonary:      Effort: Pulmonary effort is normal. No respiratory distress. Breath sounds: Normal breath sounds. Abdominal:      General: Bowel sounds are normal. There is no distension. Tenderness: There is no abdominal tenderness. Musculoskeletal:         General: Normal range of motion. Cervical back: Normal range of motion and neck supple. Feet:      Right foot:      Protective Sensation: 6 sites tested.   6 sites sensed. Skin integrity: No ulcer, skin breakdown or erythema. Toenail Condition: Right toenails are normal.      Left foot:      Protective Sensation: 6 sites tested. 6 sites sensed. Skin integrity: No ulcer, skin breakdown or callus. Toenail Condition: Left toenails are normal.   Lymphadenopathy:      Cervical: No cervical adenopathy. Skin:     General: Skin is warm and dry. Neurological:      General: No focal deficit present. Mental Status: He is alert and oriented to person, place, and time. Psychiatric:         Mood and Affect: Mood normal.         Behavior: Behavior normal.         Thought Content: Thought content normal.       Assessment and Plan:          1. Controlled type 2 diabetes mellitus with complication, without long-term current use of insulin ([facility])  -     POCT glycosylated hemoglobin (Hb A1C)  -      DIABETES FOOT EXAM  -     POCT microalbumin  -     Lipid, Fasting; Future  -     Hepatic Function Panel; Future  2. Essential (primary) hypertension  3. ZEFERINO (obstructive sleep apnea)  4. Cervical myelopathy ([facility] [address]  5. Need for vaccination  -     Influenza, FLUCELVAX, (age 10 mo+), IM, Preservative Free, 0.5 mL  -     Zoster, SHINGRIX, (18 yrs +), IM  6. Depression, unspecified depression type  -     buPROPion (WELLBUTRIN XL) 150 MG extended release tablet; Take 1 tablet by mouth every morning, Disp-30 tablet, R-3Normal  -     [facility])  7. Health maintenance alteration  -     HIV Screen; Future  8. Chronic obstructive pulmonary disease, unspecified COPD type ([facility] [address]   Continue with breathing exercises and follow up if worsening. Return for Follow up if symptoms persist or worsen. Patient given educational materials - see patient instructions. Discussed use, benefit, and side effects of prescribed medications. All patient questions answered. Pt voiced understanding. Reviewed health maintenance.   Instructed to continue current medications, diet and exercise. Patient agreed with treatment plan. Follow up as directed.      Electronically signed by SAMANTA Sheppard on 11/2/2022 at 10:17 AM

## 2022-11-27 DIAGNOSIS — I10 ESSENTIAL (PRIMARY) HYPERTENSION: ICD-10-CM

## 2022-11-27 DIAGNOSIS — F41.9 ANXIETY: ICD-10-CM

## 2022-11-27 DIAGNOSIS — E11.8 CONTROLLED TYPE 2 DIABETES MELLITUS WITH COMPLICATION, WITHOUT LONG-TERM CURRENT USE OF INSULIN (HCC): ICD-10-CM

## 2022-11-28 RX ORDER — LISINOPRIL 20 MG/1
TABLET ORAL
Qty: 30 TABLET | Refills: 1 | Status: SHIPPED | OUTPATIENT
Start: 2022-11-28

## 2022-11-28 RX ORDER — CITALOPRAM 40 MG/1
TABLET ORAL
Qty: 30 TABLET | Refills: 1 | Status: SHIPPED | OUTPATIENT
Start: 2022-11-28

## 2022-11-28 RX ORDER — AMLODIPINE BESYLATE 10 MG/1
TABLET ORAL
Qty: 30 TABLET | Refills: 1 | Status: SHIPPED | OUTPATIENT
Start: 2022-11-28

## 2022-11-28 NOTE — PROGRESS NOTES
ADULT BEHAVIORAL HEALTH       Visit Date: 12/2/2022  Time of appointment:  1:00pm   Time spent with Patient: 60 minutes. This is patient's Initial appointment. Reason for Consult:    Chief Complaint   Patient presents with    Depression    Anxiety       Referring Provider/PCP:    Louis Oro PA      Pt provided informed consent for the behavioral health program. Discussed with patient model of service to include the limits of confidentiality (i.e. abuse reporting, suicide intervention, etc.) and short-term intervention focused approach. Pt indicated understanding. PRESENTING PROBLEM AND HISTORY  Saran Shaw is a 47 y.o. male who presents for new evaluation and treatment of anxiety. He has the following symptoms: depressed mood, decreased appetite, weight loss, decreased sleep, fatigue/lack of energy, lack of motivation, excessive guilt, low self-esteem, isolating self, anger/irritability, racing thoughts/flight of ideas, feeling nervous, anxious, or on edge, racing worry thoughts, excessive anxiety and worry about specific stressors, excessive worry about a number of events or activities , inability to stop or control worry, nightmares or flashbacks about an experience that was horrible, frightening, or upsetting, trying hard not to think of a horrible, frightening, or upsetting event, recurrent and persistent thoughts/urges/images that are intrusive and unwanted, feeling driven to perform repetitive behaviors/mental acts to reduce anxiety and distress, feeling numb or detached, feeling fidgety or restless, and history of trauma. Onset of symptoms was approximately 50 years ago. Symptoms have been gradually worsening since that time. He denies current suicidal and homicidal ideation. Family history significant for no psychiatric illness. Risk factors: none. Previous treatment includes Celexa and Wellbutrin. He complains of the following medication side effects: none.   Pt currently is prescribed Celexa and Wellbutrin. MENTAL STATUS EXAM  Mood was anxious with sad  affect. Suicidal ideation was denied. Homicidal ideation was denied. Hygiene was fair . Dress was appropriate. Behavior was Within Normal Limits with No observation of difficulties ambulating. Attitude was Cooperative. Eye-contact was fair. Speech: rate - WNL, rhythm -  WNL, volume - WNL  Verbalizations were coherent. Thought processes were intact and goal-oriented with evidence of delusions, hallucinations, obsessions, or evelyn; without little cognitive distortions. Associations were characterized by intact cognitive processes. Pt was oriented to person, place, time, and general circumstances;  recent:  fair. Insight and judgment were estimated to be fair, AEB, a fair  understanding of cyclical maladaptive patterns, and the ability to use insight to inform behavior change. CURRENT MEDICATIONS    Current Outpatient Medications:     loperamide (IMODIUM) 2 MG capsule, Take 1 capsule by mouth 2 times daily as needed for Diarrhea, Disp: 60 capsule, Rfl: 0    atorvastatin (LIPITOR) 20 MG tablet, TAKE 1 TABLET BY MOUTH EVERY DAY, Disp: 30 tablet, Rfl: 0    metFORMIN (GLUCOPHAGE) 500 MG tablet, TAKE 1 TABLET BY MOUTH IN THE MORNING AND 1 TABLET IN THE EVENING. TAKE WITH MEALS., Disp: 60 tablet, Rfl: 1    amLODIPine (NORVASC) 10 MG tablet, TAKE 1 TABLET BY MOUTH EVERY DAY IN THE MORNING, Disp: 30 tablet, Rfl: 1    citalopram (CELEXA) 40 MG tablet, TAKE 1 TABLET BY MOUTH EVERY DAY IN THE MORNING, Disp: 30 tablet, Rfl: 1    lisinopril (PRINIVIL;ZESTRIL) 20 MG tablet, TAKE 1 TABLET BY MOUTH EVERY DAY IN THE MORNING, Disp: 30 tablet, Rfl: 1    buPROPion (WELLBUTRIN XL) 150 MG extended release tablet, Take 1 tablet by mouth every morning, Disp: 30 tablet, Rfl: 3     FAMILY MEDICAL/MH HISTORY   His family history is not on file.     PATIENT MENTAL HEALTH HISTORY  Pt noted taking anti depressants in the past and now recently starting again. PSYCHOSOCIAL HISTORY  Current living situation: Pt noted living in a two bedroom apartment with his mother. Pt noted moving to [place] to help his mom six months ago. Pt noted moving from [facility] (4.5 hours away). Pt noted his children and ex-wife were in [facility]. Pt noted having two daughters (24, 21  and one son (25). Pt noted talking to his oldest daughter, pt noted his middle daughter speaks to him and his son does not. Pt noted his mother is still alive and lives with him. Pt noted his father passed away 2006. Pt noted having two older sisters who are alive. Work/Education: Pt noted is on SSDI 841.00 per month. Pt noted being on disability for the past year. Pt noted last working 10 years. Support system: Pt noted having his sisters and mother who he speaks to regularly. Gnosticism/Spirituality: Pt noted being a [place]. DRUG AND ALCOHOL CURRENT USE/HISTORY  TOBACCO:  He reports that he quit smoking about 8 years ago. His smoking use included cigarettes. He has a 5.00 pack-year smoking history. He has never used smokeless tobacco.  ALCOHOL:  He reports drinking 10 to 15 drinks per day. Pt noted being willing to work on this issue in therapy but was not interested in AOD referral at this time. OTHER SUBSTANCES: He reports current drug use. Drug: Marijuana Jurline Derik). Pt noted daily use but noted only once or twice per day. Pt would like to stop or health reasons but has had trouble doing so. ASSESSMENT  Emmie Reina presented to the appointment today for evaluation and treatment of symptoms of    Diagnosis Orders   1. Cannabis use disorder, mild, abuse        2. Major depressive disorder, recurrent, moderate (HCC)        3. PTSD (post-traumatic stress disorder)        4.  Alcohol use disorder, mild, abuse             He is currently deemed no risk to himself or others and meets criteria for major depressive disorder recurrent moderate, PTSD, cannabis use d/o mild. He will benefit from a medication evaluation to assess if major depressive disorder recurrent moderate, PTSD, cannabis use d/o mild medications could be helpful in treating major depressive disorder recurrent moderate, PTSD, cannabis use d/o mild symptoms. Piotr's major depressive disorder recurrent moderate, PTSD, cannabis use d/o mild symptoms are not well controlled at this time. He will also benefit from brief and solution-focused consultation to address cognitive and behavioral interventions for depression and anxiety, AOD symptoms. Yen Mattson was in agreement with recommendations. PHQ Scores 12/9/2022 8/2/2022   PHQ2 Score 5 0   PHQ9 Score 16 0     Interpretation of Total Score Depression Severity: 1-4 = Minimal depression, 5-9 = Mild depression, 10-14 = Moderate depression, 15-19 = Moderately severe depression, 20-27 = Severe depression    How often pt has had thoughts of death or hurting self (if PHQ positive for depression):       No flowsheet data found. Interpretation of CHRIS-7 score: 5-9 = mild anxiety, 10-14 = moderate anxiety, 15+ = severe anxiety. Recommend referral to behavioral health for scores 10 or greater. DIAGNOSIS/Plan  1. Cannabis use disorder, mild, abuse  2. Major depressive disorder, recurrent, moderate (HCC)  3. PTSD (post-traumatic stress disorder)  4. Alcohol use disorder, mild, abuse     Return in about 1 week (around 12/9/2022). INTERVENTION  Practiced assertive communication, Provided education, Discussed potential barriers to change, Established rapport, Supportive techniques, and CBT to target depression    INTERACTIVE COMPLEXITY  Is interactive complexity present?   No  Reason:  N/A  Additional Supporting Information:  N/A     Electronically signed by MADIE Chandler on 11/28/22 at 2:46 PM EST

## 2022-11-30 DIAGNOSIS — E11.8 CONTROLLED TYPE 2 DIABETES MELLITUS WITH COMPLICATION, WITHOUT LONG-TERM CURRENT USE OF INSULIN (HCC): ICD-10-CM

## 2022-11-30 RX ORDER — ATORVASTATIN CALCIUM 20 MG/1
TABLET, FILM COATED ORAL
Qty: 30 TABLET | Refills: 0 | Status: SHIPPED | OUTPATIENT
Start: 2022-11-30

## 2022-12-01 ENCOUNTER — OFFICE VISIT (OUTPATIENT)
Dept: NEUROSURGERY | Age: 52
End: 2022-12-01
Payer: MEDICAID

## 2022-12-01 VITALS
TEMPERATURE: 97 F | DIASTOLIC BLOOD PRESSURE: 87 MMHG | WEIGHT: 268 LBS | HEIGHT: 75 IN | HEART RATE: 79 BPM | SYSTOLIC BLOOD PRESSURE: 160 MMHG | BODY MASS INDEX: 33.32 KG/M2 | OXYGEN SATURATION: 96 %

## 2022-12-01 DIAGNOSIS — M47.14 THORACIC MYELOPATHY: ICD-10-CM

## 2022-12-01 DIAGNOSIS — M48.04 THORACIC STENOSIS: Primary | ICD-10-CM

## 2022-12-01 DIAGNOSIS — G95.9 CERVICAL MYELOPATHY (HCC): ICD-10-CM

## 2022-12-01 PROCEDURE — 3078F DIAST BP <80 MM HG: CPT | Performed by: NEUROLOGICAL SURGERY

## 2022-12-01 PROCEDURE — 99214 OFFICE O/P EST MOD 30 MIN: CPT | Performed by: NEUROLOGICAL SURGERY

## 2022-12-01 PROCEDURE — 3074F SYST BP LT 130 MM HG: CPT | Performed by: NEUROLOGICAL SURGERY

## 2022-12-01 NOTE — PROGRESS NOTES
Department of Neurosurgery                                                      Follow up visit      History Obtained From:  patient    CHIEF COMPLAINT:         Chief Complaint   Patient presents with    Follow-up       HISTORY OF PRESENT ILLNESS:       The patient is a 47 y.o. male who presents for follow up for Cervical myelopathy ([facility] [address] and Thoracic stenosis. Patient reports bilateral hand numbness that occurs a few times a week for a few minutes at a time. Patient reports left hand numbness is worse than right hand. Patient denies hand weakness or trouble with dexterity. Patient admits dropping things, about once a week. Patient admits some neck pain, feeling like he needs to \"crack it\" often. Patient reports bilateral hip shocking pain that radiates bilaterally into legs. Patient reports constant lower back pain. Patient reports hx of taking pain medication for 18 years, and has not taken any pain medications for almost 2 years. Patient reports he does not work. PAST MEDICAL HISTORY :       Past Medical History:        Diagnosis Date    Chronic back pain     COPD (chronic obstructive pulmonary disease) ([facility] [address]     has not seen a pulmonologist in 2 years.  Has improved with weight loss    Depression     Diabetes mellitus ([address]     Diarrhea     Hyperlipidemia     Hypertension     Lumbar herniated disc     ZEFERINO (obstructive sleep apnea)     does not use cpap    PTSD (post-traumatic stress disorder)     Thoracic myelopathy     Wellness examination 10/2022    [address]       Past Surgical History:        Procedure Laterality Date    APPENDECTOMY      JOINT REPLACEMENT Right 2020    knee    SEPTOPLASTY         Social History:   Social History     Socioeconomic History    Marital status:      Spouse name: Not on file    Number of children: Not on file    Years of education: Not on file    Highest education level: Not on file   Occupational History    Not on file Tobacco Use    Smoking status: Former     Packs/day: 0.50     Years: 10.00     Pack years: 5.00     Types: Cigarettes     Quit date:      Years since quittin.0    Smokeless tobacco: Never   Vaping Use    Vaping Use: Never used   Substance and Sexual Activity    Alcohol use: Yes     Comment: 2 times a week    Drug use: Yes     Types: Marijuana Darliss Meeter)     Comment: every day or everyother day    Sexual activity: Not on file   Other Topics Concern    Not on file   Social History Narrative    Not on file     Social Determinants of Health     Financial Resource Strain: Low Risk     Difficulty of Paying Living Expenses: Not hard at all   Food Insecurity: No Food Insecurity    Worried About Running Out of Food in the Last Year: Never true    Ran Out of Food in the Last Year: Never true   Transportation Needs: Not on file   Physical Activity: Not on file   Stress: Not on file   Social Connections: Not on file   Intimate Partner Violence: Not on file   Housing Stability: Not on file       Family History:       Problem Relation Age of Onset    COPD Mother     High Blood Pressure Father     Cancer Father        Allergies:  Patient has no known allergies. Home Medications:  Prior to Admission medications    Medication Sig Start Date End Date Taking? Authorizing Provider   metFORMIN (GLUCOPHAGE) 500 MG tablet TAKE 1 TABLET BY MOUTH IN THE MORNING AND 1 TABLET IN THE EVENING. TAKE WITH MEALS.   Patient taking differently: Take 500 mg by mouth daily (with breakfast) 22  Yes SAMANTA Rodriguez   amLODIPine (NORVASC) 10 MG tablet TAKE 1 TABLET BY MOUTH EVERY DAY IN THE MORNING 22  Yes SAMANTA Rodriguez   citalopram (CELEXA) 40 MG tablet TAKE 1 TABLET BY MOUTH EVERY DAY IN THE MORNING 22  Yes SAMANTA Rodriguez   lisinopril (PRINIVIL;ZESTRIL) 20 MG tablet TAKE 1 TABLET BY MOUTH EVERY DAY IN THE MORNING 22  Yes SAMANTA Rodriguez   buPROPion (WELLBUTRIN XL) 150 MG extended release tablet Take 1 tablet by mouth every morning 11/2/22  Yes SAMANTA Ro   loperamide (IMODIUM) 2 MG capsule TAKE 1 CAPSULE BY MOUTH 2 TIMES DAILY AS NEEDED FOR DIARRHEA 1/12/23   SAMANTA Ro   aspirin 81 MG EC tablet Take 81 mg by mouth daily    Historical Provider, MD   atorvastatin (LIPITOR) 20 MG tablet TAKE 1 TABLET BY MOUTH EVERY DAY 1/3/23   SAMANTA Ro       Current Medications:   No current facility-administered medications for this visit. PHYSICAL EXAM:       BP (!) 160/87   Pulse 79   Temp 97 °F (36.1 °C) (Temporal)   Ht 6' 3\" (1.905 m)   Wt 268 lb (121.6 kg)   SpO2 96%   BMI 33.50 kg/m²   Physical Exam     Gen: NAD  HEENT: moist mucus membranes  Cardio: RRR  Pulm: chest rise symmetrically  GI: abd soft  Ext: no edema  Skin: warm    Neuro:    AOX3  CN 2-12 grossly intact  Speech articulate  Motor 5/5  No pronator drift  Sensation symmetrical       Radiology Review:    MRI cervical spine wo contrast  9/26/2022  Official read:  Multilevel degenerative disc disease with uncovertebral and facet hypertrophy  resulting in canal stenosis and foraminal narrowing throughout the cervical  spine as described above.     My read:  Right sided foraminal stenosis worse at C7-T1 as well as T1-T2.    ASSESSMENT AND PLAN:       Patient Active Problem List   Diagnosis    Controlled type 2 diabetes mellitus with complication, without long-term current use of insulin (HCC)    Essential (primary) hypertension    Anxiety    ZEFERINO (obstructive sleep apnea)    Cervical radiculopathy    Herniated lumbar intervertebral disc    Thoracic stenosis    Cervical myelopathy (HCC)    Chronic obstructive pulmonary disease (HCC)    Thoracic myelopathy    Major depressive disorder, recurrent, moderate (HCC)    PTSD (post-traumatic stress disorder)    Cannabis use disorder, mild, abuse    Alcohol use disorder, mild, abuse         A/P:  This is a 47 y.o. male with Thoracic stenosis  Cervical myelopathy ([facility] [address]  Thoracic myelopathy I thoroughly discussed details of diagnosis, natural histories of disease, and treatment options. We discussed surgical and non-surgical options, namely T2-T3 laminectomy. I detailed the benefits, risks, recovery period, and alternatives of this surgery. I used the imaging to depict the surgery and diagnosis to the patient. Patient is agreeable to proceed with surgery at this time. Patient and/or family was counseled on the diagnosis and treatment plan    By signing my name below, I, Matilda Mora, attest that this documentation has been prepared under the direction and in the presence of Arnie Londono DO. Electronically signed: John Arroyo, 12/1/22     This note was created using voice recognition software. There may be inaccuracies of transcription  that are inadvertently overlooked prior to the signature. There is any questions about the transcription please contact me.

## 2022-12-02 ENCOUNTER — OFFICE VISIT (OUTPATIENT)
Dept: BEHAVIORAL/MENTAL HEALTH CLINIC | Age: 52
End: 2022-12-02

## 2022-12-02 DIAGNOSIS — F43.10 PTSD (POST-TRAUMATIC STRESS DISORDER): ICD-10-CM

## 2022-12-02 DIAGNOSIS — F33.1 MAJOR DEPRESSIVE DISORDER, RECURRENT, MODERATE (HCC): ICD-10-CM

## 2022-12-02 DIAGNOSIS — F12.10 CANNABIS USE DISORDER, MILD, ABUSE: Primary | ICD-10-CM

## 2022-12-02 NOTE — Clinical Note
Cannabis use disorder, mild, abuse Major depressive disorder, recurrent, moderate (HCC) PTSD (post-traumatic stress disorder)   Pt agrees to weekly to bi-weekly therapy.

## 2022-12-05 RX ORDER — LOPERAMIDE HYDROCHLORIDE 2 MG/1
2 CAPSULE ORAL 2 TIMES DAILY PRN
Qty: 60 CAPSULE | Refills: 0 | Status: SHIPPED | OUTPATIENT
Start: 2022-12-05

## 2022-12-09 ENCOUNTER — OFFICE VISIT (OUTPATIENT)
Dept: BEHAVIORAL/MENTAL HEALTH CLINIC | Age: 52
End: 2022-12-09

## 2022-12-09 DIAGNOSIS — F43.10 PTSD (POST-TRAUMATIC STRESS DISORDER): ICD-10-CM

## 2022-12-09 DIAGNOSIS — F33.1 MAJOR DEPRESSIVE DISORDER, RECURRENT, MODERATE (HCC): Primary | ICD-10-CM

## 2022-12-09 PROBLEM — F10.10 ALCOHOL USE DISORDER, MILD, ABUSE: Status: ACTIVE | Noted: 2022-12-09

## 2022-12-09 ASSESSMENT — PATIENT HEALTH QUESTIONNAIRE - PHQ9
3. TROUBLE FALLING OR STAYING ASLEEP: 2
4. FEELING TIRED OR HAVING LITTLE ENERGY: 2
10. IF YOU CHECKED OFF ANY PROBLEMS, HOW DIFFICULT HAVE THESE PROBLEMS MADE IT FOR YOU TO DO YOUR WORK, TAKE CARE OF THINGS AT HOME, OR GET ALONG WITH OTHER PEOPLE: 2
1. LITTLE INTEREST OR PLEASURE IN DOING THINGS: 2
5. POOR APPETITE OR OVEREATING: 2
SUM OF ALL RESPONSES TO PHQ9 QUESTIONS 1 & 2: 5
6. FEELING BAD ABOUT YOURSELF - OR THAT YOU ARE A FAILURE OR HAVE LET YOURSELF OR YOUR FAMILY DOWN: 2
2. FEELING DOWN, DEPRESSED OR HOPELESS: 3
7. TROUBLE CONCENTRATING ON THINGS, SUCH AS READING THE NEWSPAPER OR WATCHING TELEVISION: 2
SUM OF ALL RESPONSES TO PHQ QUESTIONS 1-9: 16
SUM OF ALL RESPONSES TO PHQ QUESTIONS 1-9: 16
8. MOVING OR SPEAKING SO SLOWLY THAT OTHER PEOPLE COULD HAVE NOTICED. OR THE OPPOSITE, BEING SO FIGETY OR RESTLESS THAT YOU HAVE BEEN MOVING AROUND A LOT MORE THAN USUAL: 1
SUM OF ALL RESPONSES TO PHQ QUESTIONS 1-9: 16
9. THOUGHTS THAT YOU WOULD BE BETTER OFF DEAD, OR OF HURTING YOURSELF: 0
SUM OF ALL RESPONSES TO PHQ QUESTIONS 1-9: 16

## 2022-12-09 NOTE — PROGRESS NOTES
ADULT BEHAVIORAL HEALTH FOLLOW UP      Visit Date: 12/9/2022   Time of appointment:  2:00pm   Time spent with Patient: 60 minutes. This is patient's second appointment. Reason for Consult:    Chief Complaint   Patient presents with    Depression    Anxiety       Referring Provider/PCP:    No ref. provider found  SAMANTA Perez      Pt provided informed consent for the behavioral health program. Discussed with patient model of service to include the limits of confidentiality (i.e. abuse reporting, suicide intervention, etc.) and short-term intervention focused approach. Pt indicated understanding. Dell Garcia is a 47 y.o. male who presents for follow up of PTSD and depression. Pt agreed to treatment plan of engaging in CBT with an objective of reducing PCL-5 score from a 41 to a 21 and PHQ9 score from a 16 to an 8. Previous Recommendations: Attend future therapy sessions. Pt encouraged to call 911 and/or go to ER in the event pt is having suicidal or homicidal ideation. MENTAL STATUS EXAM  Mood was anxious and depressed with sad  and depressed affect. Suicidal ideation was denied. Homicidal ideation was denied. Hygiene was fair . Dress was appropriate. Behavior was Within Normal Limits with No observation of difficulties ambulating. Attitude was Cooperative and Guarded. Eye-contact was fair. Speech: rate - WNL, rhythm - WNL, volume - WNL. Verbalizations were coherent. Thought processes were intact and goal-oriented without evidence of delusions, hallucinations, obsessions, or veelyn; with little cognitive distortions. Associations were characterized by intact cognitive processes. Pt was oriented to person, place, time, and general circumstances;  recent:  fair. Insight and judgment were estimated to be fair, AEB, a fair  understanding of cyclical maladaptive patterns, and the ability to use insight to inform behavior change.        ASSESSMENT  Darcus Phlegm presented to the appointment today for evaluation and treatment of symptoms of   Chief Complaint   Patient presents with    Depression    Anxiety    . He is currently deemed no risk to himself or others and meets criteria for 1. Major depressive disorder, recurrent, moderate (HCC)  2. PTSD (post-traumatic stress disorder) . Dhara Matias was in agreement with recommendations. PHQ Scores 12/9/2022 8/2/2022   PHQ2 Score 5 0   PHQ9 Score 16 0     Interpretation of Total Score Depression Severity: 1-4 = Minimal depression, 5-9 = Mild depression, 10-14 = Moderate depression, 15-19 = Moderately severe depression, 20-27 = Severe depression    How often pt has had thoughts of death or hurting self (if PHQ positive for depression):  Not at all    No flowsheet data found. Interpretation of CHRIS-7 score: 5-9 = mild anxiety, 10-14 = moderate anxiety, 15+ = severe anxiety. Recommend referral to behavioral health for scores 10 or greater. DIAGNOSIS/PLAN  1. Major depressive disorder, recurrent, moderate (HCC)  2. PTSD (post-traumatic stress disorder)       INTERVENTION  Practiced assertive communication, Provided education, Established rapport, Supportive techniques, and CBT to target depression. INTERACTIVE COMPLEXITY  Is interactive complexity present?   No  Reason:  N/A  Additional Supporting Information:  N/A       Electronically signed by MADIE Lewis on 12/9/2022 at 2:51 PM

## 2023-01-01 DIAGNOSIS — E11.8 CONTROLLED TYPE 2 DIABETES MELLITUS WITH COMPLICATION, WITHOUT LONG-TERM CURRENT USE OF INSULIN (HCC): ICD-10-CM

## 2023-01-03 RX ORDER — ATORVASTATIN CALCIUM 20 MG/1
TABLET, FILM COATED ORAL
Qty: 30 TABLET | Refills: 5 | Status: SHIPPED | OUTPATIENT
Start: 2023-01-03

## 2023-01-09 RX ORDER — SODIUM CHLORIDE, SODIUM LACTATE, POTASSIUM CHLORIDE, CALCIUM CHLORIDE 600; 310; 30; 20 MG/100ML; MG/100ML; MG/100ML; MG/100ML
1000 INJECTION, SOLUTION INTRAVENOUS CONTINUOUS
OUTPATIENT
Start: 2023-01-09

## 2023-01-09 NOTE — DISCHARGE INSTRUCTIONS
Pre-operative Instructions           NOTHING to eat or drink after midnight the night prior to surgery   (This includes gum, candy, mints, chewing tobacco, etc). Please arrive at the surgery center (Entrance B) by 6:00 AM on 1/25/2023 (or as directed by your surgeon's office). See Directons to Surgery Center below. Please take only the following medication(s) the day of surgery with a small sip of water: Amlodipine (Norvasc), Lisinopril (Prinivil)    Please stop any blood thinning medications: ASPIRIN   Failure to stop these medications as instructed (too soon or too late) may result in injury to you, or your surgery may need to be rescheduled. Below is a list of some examples for your reference. Antiplatelets : (stop blood cells (called platelets) from sticking together and forming a blood clot):   Aspirin (Bufferin, Ecotrin), Clopidogrel (Plavix), Ticagrelor (Brilinta), Prasugrel (Effient), Dipyridamole/aspirin (Aggrenox), Ticlopidine (Ticlid), Eptifibatide (Integrilin)    Anticoagulants: (slow down your body's process of making clots): Warfarin (Coumadin), Rivaroxaban (Xarelto), Dabigatran (Pradaxa), Apixaban (Eliquis), Edoxaban (Savaysa), Heparin/Enoxaparin (Lovenox), Fondaparinux (Arixtra)    NSAIDS: Aspirin (Bufferin, Ecotrin), Ibuprofen (Motrin, Nuprin,Advil), Naproxen (Aleve),Meloxicam (Mobic), Celecoxib (Celebrex), Diclofenac (Voltaren), Etodolac (Lodine), Indomethacin, Ketorolac, Nabumetone, Oxaprozin (Daypro), Piroxicam (Feldene), Excedrin (has aspirin in it)    Herbal supplements: Bromelain, Cinnamon, [employer], Dong [place] (female ginseng), Fish oil, Garlic, Melanie,Ginkgo biloba, Grape seed extract, Turmeric, Vitamin E, etc....)    You may continue the rest of your medications through the night before surgery unless instructed otherwise. If applicable:   Do not take diabetic medications on the day of surgery.   Please use/bring daily inhalers with you     1/11/23  9:45 AM      ___________________  _______________________  Signature (Provider)              Signature (Patient)     Day of Surgery/Procedure    As a patient at [facility] you can expect quality medical and nursing care that is centered on your individual needs. Our goal is to make your surgical experience as comfortable as possible    Directions to the [address] [place] [address] at [address] [place] [address]. Sofia is located in the Emergency Room parking lot on [facility] or there is additional parking across the street. The address is [address] [place] [address]. Please check in at the [facility] upon arrival.     Patient Instructions  In case of any illness please contact your surgeons office for instructions prior to coming to the hospital.    Due to current restrictions you are only allowed 2 adults to be with you. Masks are to be worn and screening will take place on arrival.     Bring your current list of medications, vitamins, herbals and anything you might take on an  \"as needed \" basis. It is important we have a correct list with dosages and frequencies. Please verify your list with your medications at home or bring all of your bottles with you. If you have been given a blood band be sure to bring it with you on the day of surgery. Do not put it on or close the clasp. Use and bring any inhalers if you are currently using one. It is ok to brush your teeth but do not swallow any water. You may be required to provide a urine sample upon your arrival to the pre-op area, so please take this into consideration prior to using the restroom. No jewelry or piercing's to be worn into surgery because you might be injured because of them. No contact lenses to be worn. It is ok to wear your glasses in pre op but they will be removed prior to going into the operating room.     Dentures/partials will likely need to be removed in pre op depending on your type of anesthesia, please do not use adhesives on the day of surgery. Bathe as instructed with the special soap given to you. No lotions, powders or creams after bathing. Wear loose comfortable clothing / shoes that are easy to get on and off over wounds or casts. If you are going to be admitted after surgery please bring your Cpap or BiPap if you have it at home. Keep patient belongings to a minimum and leave valuables at home. If you are staying overnight with us, please bring a SMALL bag of personal items. We cannot accommodate large items, like suitcases. If you are going home after anesthesia or sedation then you must have a responsible adult with you to take you home and to be with you for the first 24 hours after surgery. If you do not have someone to stay with you your surgery might get cancelled. Please contact your surgeon's office to see if other arrangements can be made if you can not find someone to stay with you. If you have any other questions on the day of surgery please contact [phone] or [phone]    If you have any other questions regarding your procedure/surgery please call  your surgeon's office.

## 2023-01-11 ENCOUNTER — HOSPITAL ENCOUNTER (OUTPATIENT)
Dept: PREADMISSION TESTING | Age: 53
Discharge: HOME | End: 2023-01-11
Payer: MEDICAID

## 2023-01-11 VITALS
HEIGHT: 75 IN | HEART RATE: 82 BPM | DIASTOLIC BLOOD PRESSURE: 76 MMHG | SYSTOLIC BLOOD PRESSURE: 113 MMHG | BODY MASS INDEX: 30.84 KG/M2 | WEIGHT: 248 LBS | RESPIRATION RATE: 18 BRPM | OXYGEN SATURATION: 96 % | TEMPERATURE: 97.6 F

## 2023-01-11 LAB
ABO/RH: NORMAL
ANTIBODY SCREEN: NEGATIVE
ARM BAND NUMBER: NORMAL
BUN BLDV-MCNC: 12 MG/DL (ref 6–20)
CREAT SERPL-MCNC: 0.76 MG/DL (ref 0.7–1.2)
EXPIRATION DATE: NORMAL
GFR SERPL CREATININE-BSD FRML MDRD: >60 ML/MIN/1.73M2
GLUCOSE BLD-MCNC: 103 MG/DL (ref 70–99)
HCT VFR BLD CALC: 47.8 % (ref 40.7–50.3)
HEMOGLOBIN: 15.8 G/DL (ref 13–17)
INR BLD: 1.1
MCH RBC QN AUTO: 30.9 PG (ref 25.2–33.5)
MCHC RBC AUTO-ENTMCNC: 33.1 G/DL (ref 28.4–34.8)
MCV RBC AUTO: 93.4 FL (ref 82.6–102.9)
NRBC AUTOMATED: 0 PER 100 WBC
PARTIAL THROMBOPLASTIN TIME: 23.9 SEC (ref 20.5–30.5)
PDW BLD-RTO: 12.4 % (ref 11.8–14.4)
PLATELET # BLD: 295 K/UL (ref 138–453)
PMV BLD AUTO: 10.4 FL (ref 8.1–13.5)
PROTHROMBIN TIME: 11.5 SEC (ref 9.1–12.3)
RBC # BLD: 5.12 M/UL (ref 4.21–5.77)
WBC # BLD: 9.8 K/UL (ref 3.5–11.3)

## 2023-01-11 PROCEDURE — 86850 RBC ANTIBODY SCREEN: CPT

## 2023-01-11 PROCEDURE — 82947 ASSAY GLUCOSE BLOOD QUANT: CPT

## 2023-01-11 PROCEDURE — 86900 BLOOD TYPING SEROLOGIC ABO: CPT

## 2023-01-11 PROCEDURE — 82565 ASSAY OF CREATININE: CPT

## 2023-01-11 PROCEDURE — 85610 PROTHROMBIN TIME: CPT

## 2023-01-11 PROCEDURE — 86901 BLOOD TYPING SEROLOGIC RH(D): CPT

## 2023-01-11 PROCEDURE — 84520 ASSAY OF UREA NITROGEN: CPT

## 2023-01-11 PROCEDURE — 36415 COLL VENOUS BLD VENIPUNCTURE: CPT

## 2023-01-11 PROCEDURE — 85730 THROMBOPLASTIN TIME PARTIAL: CPT

## 2023-01-11 PROCEDURE — 93005 ELECTROCARDIOGRAM TRACING: CPT | Performed by: STUDENT IN AN ORGANIZED HEALTH CARE EDUCATION/TRAINING PROGRAM

## 2023-01-11 PROCEDURE — 85027 COMPLETE CBC AUTOMATED: CPT

## 2023-01-11 RX ORDER — ASPIRIN 81 MG/1
81 TABLET ORAL DAILY
COMMUNITY

## 2023-01-11 NOTE — H&P (VIEW-ONLY)
History and Physical    Pt Name: Natalee Griggs  MRN: [id number]  YOB: 1970  Date of evaluation: 1/11/2023  Primary Care Physician: SAMANTA Bustillo    SUBJECTIVE:   History of Chief Complaint:    Natalee Griggs is a 47 y.o. male who presents for PAT appointment. Patient complains of lower back pain with radiculopathy to left leg with numbness and tingling for the last 20 years, worsening in recent months. 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 Patient states he has tried physical therapy and injections in the past without relief. Patient has been scheduled for T2-3 LAMINECTOMY  Allergies  has No Known Allergies. Medications  Prior to Admission medications    Medication Sig Start Date End Date Taking? Authorizing Provider   aspirin 81 MG EC tablet Take 81 mg by mouth daily   Yes Historical Provider, MD   atorvastatin (LIPITOR) 20 MG tablet TAKE 1 TABLET BY MOUTH EVERY DAY 1/3/23   SAMANTA Bustillo   loperamide (IMODIUM) 2 MG capsule Take 1 capsule by mouth 2 times daily as needed for Diarrhea 12/5/22   SAMANTA Bustillo   metFORMIN (GLUCOPHAGE) 500 MG tablet TAKE 1 TABLET BY MOUTH IN THE MORNING AND 1 TABLET IN THE EVENING. TAKE WITH MEALS.   Patient taking differently: Take 500 mg by mouth daily (with breakfast) 11/28/22   SAMANTA Bustillo   amLODIPine (NORVASC) 10 MG tablet TAKE 1 TABLET BY MOUTH EVERY DAY IN THE MORNING 11/28/22   SAMANTA Bustillo   citalopram (CELEXA) 40 MG tablet TAKE 1 TABLET BY MOUTH EVERY DAY IN THE MORNING 11/28/22   SAMANTA Bustillo   lisinopril (PRINIVIL;ZESTRIL) 20 MG tablet TAKE 1 TABLET BY MOUTH EVERY DAY IN THE MORNING 11/28/22   SAMANTA Bustillo   buPROPion (WELLBUTRIN XL) 150 MG extended release tablet Take 1 tablet by mouth every morning 11/2/22   SAMANTA Bustillo     Past Medical History    has a past medical history of Chronic back pain, COPD (chronic obstructive pulmonary disease) (HCC), Depression, Diabetes mellitus (HCC), Diarrhea, Hyperlipidemia, Hypertension, Lumbar herniated disc, ZEFERINO (obstructive sleep apnea), PTSD (post-traumatic stress disorder), Thoracic myelopathy, and Wellness examination.  Past Surgical History   has a past surgical history that includes joint replacement (Right, 2020); Septoplasty; and Appendectomy.  Social History   reports that he quit smoking about 9 years ago. His smoking use included cigarettes. He has a 5.00 pack-year smoking history. He has never used smokeless tobacco.    reports current alcohol use.    reports current drug use. Drug: Marijuana (Weed).   Marital Status   Children 3  Occupation disability  Family History  Family Status   Relation Name Status    Mother  Alive    Father       family history includes COPD in his mother; Cancer in his father; High Blood Pressure in his father.    Review of Systems:  CONSTITUTIONAL:   negative for fevers, chills, fatigue and malaise    EYES:   negative for double vision, blurred vision and photophobia    HEENT:   negative for tinnitus, epistaxis and sore throat     RESPIRATORY:   negative for cough, shortness of breath, wheezing    CARDIOVASCULAR:   negative for chest pain, palpitations, syncope, edema     GASTROINTESTINAL:   negative for nausea, vomiting     GENITOURINARY:   negative for incontinence     MUSCULOSKELETAL:   lower back pain with radiculopathy to left leg with numbness and tingling   NEUROLOGICAL:   Negative for weakness  negative for headaches and dizziness     PSYCHIATRIC:   negative for anxiety       OBJECTIVE:   VITALS:  height is 6' 3\" (1.905 m) and weight is 248 lb (112.5 kg). His infrared temperature is 97.6 °F (36.4 °C). His blood pressure is 113/76 and his pulse is 82. His respiration is 18 and oxygen saturation is 96%.   CONSTITUTIONAL:alert & oriented x 3, no acute distress. Calm and pleasant.  SKIN:  Warm and dry, no rashes to exposed  areas of skin.   HEAD:  Normocephalic, atraumatic.   EYES: PERRL.  EOMs intact.  EARS:  Intact and equal bilaterally. Hearing grossly WNL.    NOSE:  Nares patent.  No rhinorrhea   MOUTH/THROAT:  Mucous membranes pink and moist, teeth appear to be intact.   NECK:supple, good ROM.  LUNGS: Respirations even and non-labored. Clear to auscultation bilaterally,mildly diminished to bilateral bases.  CARDIOVASCULAR: Regular rate and rhythm, no murmurs.   ABDOMEN: soft, non-tender, non-distended, bowel sounds active x 4   EXTREMITIES: No edema to bilateral lower extremities.  No varicosities to bilateral lower extremities.   NEUROLOGIC: CN II-XII are grossly intact. Gait is smooth.  Testing:   EK2022.  Labs pending: drawn 2023   IMPRESSIONS:   Thoracic myelopathy.   PLANS:   T2-3 LAMINECTOMY.    COLTON Clark CNP  Electronically signed 2023 at 10:15 AM

## 2023-01-11 NOTE — PROGRESS NOTES
Anesthesia Focused Assessment    Hx of anesthesia complications:  no  Family hx of anesthesia complications:  no      Prior + Covid-19 test? no        STOP-BANG Sleep Apnea Questionnaire    SNORE loudly (heard through closed doors)? Yes  TIRED, fatigued, sleepy during daytime? No  OBSERVED stopping breathing during sleep? Yes  High blood PRESSURE or being treated? Yes    BMI over 35? No  AGE over 48? No  NECK circumference over 16\"? No  GENDER (male)? Yes             Total 4  High risk 5-8  Intermediate risk 3-4  Low risk 0-2    ----------------------------------------------------------------------------------------------------------------------  ZEFERINO                              Yes  If yes, machine? No, cannot tolerate    DM1                                            No  DM2                   Yes    Coronary Artery Disease      No  HTN         Yes  Defib/AICD/Pacemaker               No             Renal Failure                   No  If yes, on dialysis           Active smoker? No, quit 2014  Drinks alcohol? Yes, occasional  Illicit drugs? Yes, THC daily  Dentition?        benign      Past Medical History:   Diagnosis Date    Chronic back pain     COPD (chronic obstructive pulmonary disease) ([facility] [address]     has not seen a pulmonologist in 2 years. Has improved with weight loss    Depression     Diabetes mellitus ([address]     Diarrhea     Hyperlipidemia     Hypertension     Lumbar herniated disc     ZEFERINO (obstructive sleep apnea)     does not use cpap    PTSD (post-traumatic stress disorder)     Thoracic myelopathy     Wellness examination 10/2022    [facility]         Patient was evaluated in PAT & anesthesia guidelines were applied. NPO guidelines, medication instructions and scheduled arrival time were reviewed with patient. Anesthesia contacted:   no    Medical or cardiac clearance ordered: no Jannett Closs, APRN - CNP   1/11/23  10:17 AM

## 2023-01-11 NOTE — H&P
History and Physical    Pt Name: Sofia Spaulding  MRN: [id number]  YOB: 1970  Date of evaluation: 1/11/2023  Primary Care Physician: SAMANTA Garcia    SUBJECTIVE:   History of Chief Complaint:    Sofia Spaulding is a 47 y.o. male who presents for PAT appointment. Patient complains of lower back pain with radiculopathy to left leg with numbness and tingling for the last 20 years, worsening in recent months. 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 Patient states he has tried physical therapy and injections in the past without relief. Patient has been scheduled for T2-3 LAMINECTOMY  Allergies  has No Known Allergies. Medications  Prior to Admission medications    Medication Sig Start Date End Date Taking? Authorizing Provider   aspirin 81 MG EC tablet Take 81 mg by mouth daily   Yes Historical Provider, MD   atorvastatin (LIPITOR) 20 MG tablet TAKE 1 TABLET BY MOUTH EVERY DAY 1/3/23   SAMANTA Garcia   loperamide (IMODIUM) 2 MG capsule Take 1 capsule by mouth 2 times daily as needed for Diarrhea 12/5/22   SAMANTA Garcia   metFORMIN (GLUCOPHAGE) 500 MG tablet TAKE 1 TABLET BY MOUTH IN THE MORNING AND 1 TABLET IN THE EVENING. TAKE WITH MEALS.   Patient taking differently: Take 500 mg by mouth daily (with breakfast) 11/28/22   SAMANTA Garcia   amLODIPine (NORVASC) 10 MG tablet TAKE 1 TABLET BY MOUTH EVERY DAY IN THE MORNING 11/28/22   SAMANTA Garcia   citalopram (CELEXA) 40 MG tablet TAKE 1 TABLET BY MOUTH EVERY DAY IN THE MORNING 11/28/22   SAMANTA Garcia   lisinopril (PRINIVIL;ZESTRIL) 20 MG tablet TAKE 1 TABLET BY MOUTH EVERY DAY IN THE MORNING 11/28/22   SAMANTA Garcia   buPROPion (WELLBUTRIN XL) 150 MG extended release tablet Take 1 tablet by mouth every morning 11/2/22   SAMANTA Garcia     Past Medical History    has a past medical history of Chronic back pain, COPD (chronic obstructive pulmonary disease) ([facility] [address] Depression, Diabetes mellitus ([facility] [address] Diarrhea, Hyperlipidemia, Hypertension, Lumbar herniated disc, ZEFERINO (obstructive sleep apnea), PTSD (post-traumatic stress disorder), Thoracic myelopathy, and Wellness examination. Past Surgical History   has a past surgical history that includes joint replacement (Right, ); Septoplasty; and Appendectomy. Social History   reports that he quit smoking about 9 years ago. His smoking use included cigarettes. He has a 5.00 pack-year smoking history. He has never used smokeless tobacco.    reports current alcohol use. reports current drug use. Drug: Marijuana Kenard Snooks). Marital Status   Children 3  Occupation disability  Family History  Family Status   Relation Name Status    Mother  Alive    Father       family history includes COPD in his mother; Cancer in his father; High Blood Pressure in his father. Review of Systems:  CONSTITUTIONAL:   negative for fevers, chills, fatigue and malaise    EYES:   negative for double vision, blurred vision and photophobia    HEENT:   negative for tinnitus, epistaxis and sore throat     RESPIRATORY:   negative for cough, shortness of breath, wheezing    CARDIOVASCULAR:   negative for chest pain, palpitations, syncope, edema     GASTROINTESTINAL:   negative for nausea, vomiting     GENITOURINARY:   negative for incontinence     MUSCULOSKELETAL:   lower back pain with radiculopathy to left leg with numbness and tingling   NEUROLOGICAL:   Negative for weakness  negative for headaches and dizziness     PSYCHIATRIC:   negative for anxiety       OBJECTIVE:   VITALS:  height is 6' 3\" (1.905 m) and weight is 248 lb (112.5 kg). His infrared temperature is 97.6 °F (36.4 °C). His blood pressure is 113/76 and his pulse is 82. His respiration is 18 and oxygen saturation is 96%. CONSTITUTIONAL:alert & oriented x 3, no acute distress. Calm and pleasant.   SKIN:  Warm and dry, no rashes to exposed areas of skin. HEAD:  Normocephalic, atraumatic. EYES: PERRL. EOMs intact. EARS:  Intact and equal bilaterally. Hearing grossly WNL. NOSE:  Nares patent. No rhinorrhea   MOUTH/THROAT:  Mucous membranes pink and moist, teeth appear to be intact. NECK:supple, good ROM. LUNGS: Respirations even and non-labored. Clear to auscultation bilaterally,mildly diminished to bilateral bases. CARDIOVASCULAR: Regular rate and rhythm, no murmurs. ABDOMEN: soft, non-tender, non-distended, bowel sounds active x 4   EXTREMITIES: No edema to bilateral lower extremities. No varicosities to bilateral lower extremities. NEUROLOGIC: CN II-XII are grossly intact. Gait is smooth. Testing:   EK2022. Labs pending: drawn 2023   IMPRESSIONS:   Thoracic myelopathy. PLANS:   T2-3 LAMINECTOMY.     Marcelo Boeck, APRN - CNP  Electronically signed 2023 at 10:15 AM

## 2023-01-12 LAB
EKG ATRIAL RATE: 72 BPM
EKG P AXIS: 72 DEGREES
EKG P-R INTERVAL: 166 MS
EKG Q-T INTERVAL: 406 MS
EKG QRS DURATION: 94 MS
EKG QTC CALCULATION (BAZETT): 444 MS
EKG R AXIS: 49 DEGREES
EKG T AXIS: 68 DEGREES
EKG VENTRICULAR RATE: 72 BPM

## 2023-01-12 PROCEDURE — 93010 ELECTROCARDIOGRAM REPORT: CPT | Performed by: INTERNAL MEDICINE

## 2023-01-12 RX ORDER — LOPERAMIDE HYDROCHLORIDE 2 MG/1
2 CAPSULE ORAL 2 TIMES DAILY PRN
Qty: 60 CAPSULE | Refills: 0 | Status: SHIPPED | OUTPATIENT
Start: 2023-01-12

## 2023-01-24 ENCOUNTER — ANESTHESIA EVENT (OUTPATIENT)
Dept: OPERATING ROOM | Age: 53
DRG: 310 | End: 2023-01-24
Payer: MEDICAID

## 2023-01-24 DIAGNOSIS — I10 ESSENTIAL (PRIMARY) HYPERTENSION: ICD-10-CM

## 2023-01-24 RX ORDER — LISINOPRIL 20 MG/1
TABLET ORAL
Qty: 30 TABLET | Refills: 1 | Status: SHIPPED | OUTPATIENT
Start: 2023-01-24

## 2023-01-25 ENCOUNTER — ANESTHESIA (OUTPATIENT)
Dept: OPERATING ROOM | Age: 53
DRG: 310 | End: 2023-01-25
Payer: MEDICAID

## 2023-01-25 ENCOUNTER — HOSPITAL ENCOUNTER (INPATIENT)
Age: 53
LOS: 1 days | Discharge: HOME | DRG: 310 | End: 2023-01-26
Attending: NEUROLOGICAL SURGERY | Admitting: NEUROLOGICAL SURGERY
Payer: MEDICAID

## 2023-01-25 ENCOUNTER — APPOINTMENT (OUTPATIENT)
Dept: GENERAL RADIOLOGY | Age: 53
DRG: 310 | End: 2023-01-25
Attending: NEUROLOGICAL SURGERY
Payer: MEDICAID

## 2023-01-25 DIAGNOSIS — G89.18 ACUTE POST-OPERATIVE PAIN: Primary | ICD-10-CM

## 2023-01-25 DIAGNOSIS — Z98.1 S/P FUSION OF THORACIC SPINE: ICD-10-CM

## 2023-01-25 PROBLEM — M48.04 DEGENERATIVE THORACIC SPINAL STENOSIS: Status: ACTIVE | Noted: 2023-01-25

## 2023-01-25 LAB
EGFR, POC: >60 ML/MIN/1.73M2
GLUCOSE BLD-MCNC: 124 MG/DL (ref 74–100)
GLUCOSE BLD-MCNC: 134 MG/DL (ref 75–110)
GLUCOSE BLD-MCNC: 152 MG/DL (ref 75–110)
GLUCOSE BLD-MCNC: 156 MG/DL (ref 75–110)
POC BUN: 14 MG/DL (ref 8–26)
POC CHLORIDE: 108 MMOL/L (ref 98–107)
POC CREATININE: 0.74 MG/DL (ref 0.51–1.19)
POC HEMATOCRIT: 47 % (ref 41–53)
POC HEMOGLOBIN: 16.1 G/DL (ref 13.5–17.5)
POC IONIZED CALCIUM: 1.22 MMOL/L (ref 1.15–1.33)
POC LACTIC ACID: 1.02 MMOL/L (ref 0.56–1.39)
POC POTASSIUM: 3.9 MMOL/L (ref 3.5–4.5)
POC SODIUM: 144 MMOL/L (ref 138–146)

## 2023-01-25 PROCEDURE — 6360000002 HC RX W HCPCS: Performed by: NURSE ANESTHETIST, CERTIFIED REGISTERED

## 2023-01-25 PROCEDURE — 3700000000 HC ANESTHESIA ATTENDED CARE: Performed by: NEUROLOGICAL SURGERY

## 2023-01-25 PROCEDURE — 2500000003 HC RX 250 WO HCPCS: Performed by: NEUROLOGICAL SURGERY

## 2023-01-25 PROCEDURE — 3209999900 FLUORO FOR SURGICAL PROCEDURES

## 2023-01-25 PROCEDURE — 3600000014 HC SURGERY LEVEL 4 ADDTL 15MIN: Performed by: NEUROLOGICAL SURGERY

## 2023-01-25 PROCEDURE — 2720000010 HC SURG SUPPLY STERILE: Performed by: NEUROLOGICAL SURGERY

## 2023-01-25 PROCEDURE — C9290 INJ, BUPIVACAINE LIPOSOME: HCPCS | Performed by: NEUROLOGICAL SURGERY

## 2023-01-25 PROCEDURE — 3700000001 HC ADD 15 MINUTES (ANESTHESIA): Performed by: NEUROLOGICAL SURGERY

## 2023-01-25 PROCEDURE — 2580000003 HC RX 258: Performed by: NEUROLOGICAL SURGERY

## 2023-01-25 PROCEDURE — 85014 HEMATOCRIT: CPT

## 2023-01-25 PROCEDURE — 63003 REMOVE SPINE LAMINA 1/2 THRC: CPT | Performed by: NEUROLOGICAL SURGERY

## 2023-01-25 PROCEDURE — 7100000000 HC PACU RECOVERY - FIRST 15 MIN: Performed by: NEUROLOGICAL SURGERY

## 2023-01-25 PROCEDURE — 7100000001 HC PACU RECOVERY - ADDTL 15 MIN: Performed by: NEUROLOGICAL SURGERY

## 2023-01-25 PROCEDURE — 6370000000 HC RX 637 (ALT 250 FOR IP): Performed by: NEUROLOGICAL SURGERY

## 2023-01-25 PROCEDURE — 82565 ASSAY OF CREATININE: CPT

## 2023-01-25 PROCEDURE — 3600000004 HC SURGERY LEVEL 4 BASE: Performed by: NEUROLOGICAL SURGERY

## 2023-01-25 PROCEDURE — 6370000000 HC RX 637 (ALT 250 FOR IP): Performed by: PHYSICIAN ASSISTANT

## 2023-01-25 PROCEDURE — 6360000002 HC RX W HCPCS: Performed by: NEUROLOGICAL SURGERY

## 2023-01-25 PROCEDURE — 6360000002 HC RX W HCPCS: Performed by: PHYSICIAN ASSISTANT

## 2023-01-25 PROCEDURE — 84520 ASSAY OF UREA NITROGEN: CPT

## 2023-01-25 PROCEDURE — 2500000003 HC RX 250 WO HCPCS: Performed by: NURSE ANESTHETIST, CERTIFIED REGISTERED

## 2023-01-25 PROCEDURE — 82435 ASSAY OF BLOOD CHLORIDE: CPT

## 2023-01-25 PROCEDURE — 83605 ASSAY OF LACTIC ACID: CPT

## 2023-01-25 PROCEDURE — 2580000003 HC RX 258: Performed by: STUDENT IN AN ORGANIZED HEALTH CARE EDUCATION/TRAINING PROGRAM

## 2023-01-25 PROCEDURE — 84132 ASSAY OF SERUM POTASSIUM: CPT

## 2023-01-25 PROCEDURE — 82330 ASSAY OF CALCIUM: CPT

## 2023-01-25 PROCEDURE — 2709999900 HC NON-CHARGEABLE SUPPLY: Performed by: NEUROLOGICAL SURGERY

## 2023-01-25 PROCEDURE — 84295 ASSAY OF SERUM SODIUM: CPT

## 2023-01-25 PROCEDURE — 2580000003 HC RX 258: Performed by: PHYSICIAN ASSISTANT

## 2023-01-25 PROCEDURE — 2580000003 HC RX 258: Performed by: NURSE ANESTHETIST, CERTIFIED REGISTERED

## 2023-01-25 PROCEDURE — 2060000000 HC ICU INTERMEDIATE R&B

## 2023-01-25 PROCEDURE — 00NX0ZZ RELEASE THORACIC SPINAL CORD, OPEN APPROACH: ICD-10-PCS | Performed by: NEUROLOGICAL SURGERY

## 2023-01-25 PROCEDURE — 82947 ASSAY GLUCOSE BLOOD QUANT: CPT

## 2023-01-25 RX ORDER — POLYETHYLENE GLYCOL 3350 17 G/17G
17 POWDER, FOR SOLUTION ORAL DAILY PRN
Status: DISCONTINUED | OUTPATIENT
Start: 2023-01-25 | End: 2023-01-26 | Stop reason: HOSPADM

## 2023-01-25 RX ORDER — SODIUM CHLORIDE, SODIUM LACTATE, POTASSIUM CHLORIDE, CALCIUM CHLORIDE 600; 310; 30; 20 MG/100ML; MG/100ML; MG/100ML; MG/100ML
1000 INJECTION, SOLUTION INTRAVENOUS CONTINUOUS
Status: DISCONTINUED | OUTPATIENT
Start: 2023-01-25 | End: 2023-01-25 | Stop reason: HOSPADM

## 2023-01-25 RX ORDER — ROCURONIUM BROMIDE 10 MG/ML
INJECTION, SOLUTION INTRAVENOUS PRN
Status: DISCONTINUED | OUTPATIENT
Start: 2023-01-25 | End: 2023-01-25 | Stop reason: SDUPTHER

## 2023-01-25 RX ORDER — POLYETHYLENE GLYCOL 3350 17 G/17G
17 POWDER, FOR SOLUTION ORAL DAILY
Status: DISCONTINUED | OUTPATIENT
Start: 2023-01-25 | End: 2023-01-26 | Stop reason: HOSPADM

## 2023-01-25 RX ORDER — OXYCODONE HYDROCHLORIDE 5 MG/1
5 TABLET ORAL EVERY 4 HOURS PRN
Status: DISCONTINUED | OUTPATIENT
Start: 2023-01-25 | End: 2023-01-26 | Stop reason: HOSPADM

## 2023-01-25 RX ORDER — FENTANYL CITRATE 50 UG/ML
INJECTION, SOLUTION INTRAMUSCULAR; INTRAVENOUS PRN
Status: DISCONTINUED | OUTPATIENT
Start: 2023-01-25 | End: 2023-01-25 | Stop reason: SDUPTHER

## 2023-01-25 RX ORDER — SODIUM CHLORIDE 9 MG/ML
INJECTION, SOLUTION INTRAVENOUS PRN
Status: DISCONTINUED | OUTPATIENT
Start: 2023-01-25 | End: 2023-01-25 | Stop reason: HOSPADM

## 2023-01-25 RX ORDER — SODIUM CHLORIDE 0.9 % (FLUSH) 0.9 %
5-40 SYRINGE (ML) INJECTION EVERY 12 HOURS SCHEDULED
Status: DISCONTINUED | OUTPATIENT
Start: 2023-01-25 | End: 2023-01-26 | Stop reason: HOSPADM

## 2023-01-25 RX ORDER — GLYCOPYRROLATE 0.2 MG/ML
INJECTION INTRAMUSCULAR; INTRAVENOUS PRN
Status: DISCONTINUED | OUTPATIENT
Start: 2023-01-25 | End: 2023-01-25 | Stop reason: SDUPTHER

## 2023-01-25 RX ORDER — ENOXAPARIN SODIUM 100 MG/ML
40 INJECTION SUBCUTANEOUS DAILY
Status: DISCONTINUED | OUTPATIENT
Start: 2023-01-27 | End: 2023-01-26 | Stop reason: HOSPADM

## 2023-01-25 RX ORDER — ONDANSETRON 2 MG/ML
INJECTION INTRAMUSCULAR; INTRAVENOUS PRN
Status: DISCONTINUED | OUTPATIENT
Start: 2023-01-25 | End: 2023-01-25 | Stop reason: SDUPTHER

## 2023-01-25 RX ORDER — MIDAZOLAM HYDROCHLORIDE 1 MG/ML
INJECTION INTRAMUSCULAR; INTRAVENOUS PRN
Status: DISCONTINUED | OUTPATIENT
Start: 2023-01-25 | End: 2023-01-25 | Stop reason: SDUPTHER

## 2023-01-25 RX ORDER — SODIUM CHLORIDE 0.9 % (FLUSH) 0.9 %
5-40 SYRINGE (ML) INJECTION EVERY 12 HOURS SCHEDULED
Status: DISCONTINUED | OUTPATIENT
Start: 2023-01-25 | End: 2023-01-25 | Stop reason: HOSPADM

## 2023-01-25 RX ORDER — MAGNESIUM HYDROXIDE 1200 MG/15ML
LIQUID ORAL CONTINUOUS PRN
Status: COMPLETED | OUTPATIENT
Start: 2023-01-25 | End: 2023-01-25

## 2023-01-25 RX ORDER — DEXAMETHASONE SODIUM PHOSPHATE 10 MG/ML
INJECTION, SOLUTION INTRAMUSCULAR; INTRAVENOUS PRN
Status: DISCONTINUED | OUTPATIENT
Start: 2023-01-25 | End: 2023-01-25 | Stop reason: SDUPTHER

## 2023-01-25 RX ORDER — MIDAZOLAM HYDROCHLORIDE 2 MG/2ML
1 INJECTION, SOLUTION INTRAMUSCULAR; INTRAVENOUS EVERY 10 MIN PRN
Status: DISCONTINUED | OUTPATIENT
Start: 2023-01-25 | End: 2023-01-25 | Stop reason: HOSPADM

## 2023-01-25 RX ORDER — SODIUM CHLORIDE, SODIUM LACTATE, POTASSIUM CHLORIDE, CALCIUM CHLORIDE 600; 310; 30; 20 MG/100ML; MG/100ML; MG/100ML; MG/100ML
INJECTION, SOLUTION INTRAVENOUS CONTINUOUS PRN
Status: DISCONTINUED | OUTPATIENT
Start: 2023-01-25 | End: 2023-01-25 | Stop reason: SDUPTHER

## 2023-01-25 RX ORDER — ONDANSETRON 2 MG/ML
4 INJECTION INTRAMUSCULAR; INTRAVENOUS
Status: DISCONTINUED | OUTPATIENT
Start: 2023-01-25 | End: 2023-01-25 | Stop reason: HOSPADM

## 2023-01-25 RX ORDER — LIDOCAINE HYDROCHLORIDE 20 MG/ML
INJECTION, SOLUTION INTRAVENOUS PRN
Status: DISCONTINUED | OUTPATIENT
Start: 2023-01-25 | End: 2023-01-25 | Stop reason: SDUPTHER

## 2023-01-25 RX ORDER — SODIUM CHLORIDE 9 MG/ML
INJECTION, SOLUTION INTRAVENOUS PRN
Status: DISCONTINUED | OUTPATIENT
Start: 2023-01-25 | End: 2023-01-26 | Stop reason: HOSPADM

## 2023-01-25 RX ORDER — FENTANYL CITRATE 50 UG/ML
25 INJECTION, SOLUTION INTRAMUSCULAR; INTRAVENOUS EVERY 5 MIN PRN
Status: DISCONTINUED | OUTPATIENT
Start: 2023-01-25 | End: 2023-01-25 | Stop reason: HOSPADM

## 2023-01-25 RX ORDER — KETAMINE HCL IN NACL, ISO-OSM 100MG/10ML
SYRINGE (ML) INJECTION PRN
Status: DISCONTINUED | OUTPATIENT
Start: 2023-01-25 | End: 2023-01-25 | Stop reason: SDUPTHER

## 2023-01-25 RX ORDER — PHENYLEPHRINE HCL IN 0.9% NACL 50MG/250ML
PLASTIC BAG, INJECTION (ML) INTRAVENOUS CONTINUOUS PRN
Status: DISCONTINUED | OUTPATIENT
Start: 2023-01-25 | End: 2023-01-25 | Stop reason: SDUPTHER

## 2023-01-25 RX ORDER — METHOCARBAMOL 750 MG/1
750 TABLET, FILM COATED ORAL EVERY 8 HOURS PRN
Status: DISCONTINUED | OUTPATIENT
Start: 2023-01-25 | End: 2023-01-26 | Stop reason: HOSPADM

## 2023-01-25 RX ORDER — BUPROPION HYDROCHLORIDE 150 MG/1
150 TABLET ORAL EVERY MORNING
Status: DISCONTINUED | OUTPATIENT
Start: 2023-01-26 | End: 2023-01-26 | Stop reason: HOSPADM

## 2023-01-25 RX ORDER — SODIUM CHLORIDE 0.9 % (FLUSH) 0.9 %
5-40 SYRINGE (ML) INJECTION PRN
Status: DISCONTINUED | OUTPATIENT
Start: 2023-01-25 | End: 2023-01-25 | Stop reason: HOSPADM

## 2023-01-25 RX ORDER — BISACODYL 10 MG
10 SUPPOSITORY, RECTAL RECTAL DAILY PRN
Status: DISCONTINUED | OUTPATIENT
Start: 2023-01-25 | End: 2023-01-26 | Stop reason: HOSPADM

## 2023-01-25 RX ORDER — LIDOCAINE HYDROCHLORIDE 10 MG/ML
1 INJECTION, SOLUTION INFILTRATION; PERINEURAL
Status: DISCONTINUED | OUTPATIENT
Start: 2023-01-25 | End: 2023-01-25 | Stop reason: HOSPADM

## 2023-01-25 RX ORDER — LIDOCAINE HYDROCHLORIDE AND EPINEPHRINE 10; 10 MG/ML; UG/ML
INJECTION, SOLUTION INFILTRATION; PERINEURAL PRN
Status: DISCONTINUED | OUTPATIENT
Start: 2023-01-25 | End: 2023-01-25 | Stop reason: ALTCHOICE

## 2023-01-25 RX ORDER — OXYCODONE HYDROCHLORIDE 5 MG/1
10 TABLET ORAL EVERY 4 HOURS PRN
Status: DISCONTINUED | OUTPATIENT
Start: 2023-01-25 | End: 2023-01-26 | Stop reason: HOSPADM

## 2023-01-25 RX ORDER — FENTANYL CITRATE 50 UG/ML
50 INJECTION, SOLUTION INTRAMUSCULAR; INTRAVENOUS EVERY 5 MIN PRN
Status: DISCONTINUED | OUTPATIENT
Start: 2023-01-25 | End: 2023-01-25 | Stop reason: HOSPADM

## 2023-01-25 RX ORDER — FENTANYL CITRATE 50 UG/ML
25 INJECTION, SOLUTION INTRAMUSCULAR; INTRAVENOUS
Status: DISCONTINUED | OUTPATIENT
Start: 2023-01-25 | End: 2023-01-25 | Stop reason: HOSPADM

## 2023-01-25 RX ORDER — SODIUM CHLORIDE 9 MG/ML
INJECTION, SOLUTION INTRAVENOUS CONTINUOUS
Status: DISCONTINUED | OUTPATIENT
Start: 2023-01-25 | End: 2023-01-26

## 2023-01-25 RX ORDER — LISINOPRIL 20 MG/1
20 TABLET ORAL DAILY
Status: DISCONTINUED | OUTPATIENT
Start: 2023-01-26 | End: 2023-01-26 | Stop reason: HOSPADM

## 2023-01-25 RX ORDER — CITALOPRAM 20 MG/1
40 TABLET ORAL DAILY
Status: DISCONTINUED | OUTPATIENT
Start: 2023-01-25 | End: 2023-01-26 | Stop reason: HOSPADM

## 2023-01-25 RX ORDER — DIPHENHYDRAMINE HYDROCHLORIDE 50 MG/ML
12.5 INJECTION INTRAMUSCULAR; INTRAVENOUS
Status: DISCONTINUED | OUTPATIENT
Start: 2023-01-25 | End: 2023-01-25 | Stop reason: HOSPADM

## 2023-01-25 RX ORDER — ATORVASTATIN CALCIUM 20 MG/1
20 TABLET, FILM COATED ORAL DAILY
Status: DISCONTINUED | OUTPATIENT
Start: 2023-01-25 | End: 2023-01-26 | Stop reason: HOSPADM

## 2023-01-25 RX ORDER — PHENYLEPHRINE HYDROCHLORIDE 10 MG/ML
INJECTION INTRAVENOUS PRN
Status: DISCONTINUED | OUTPATIENT
Start: 2023-01-25 | End: 2023-01-25 | Stop reason: SDUPTHER

## 2023-01-25 RX ORDER — SODIUM CHLORIDE 0.9 % (FLUSH) 0.9 %
5-40 SYRINGE (ML) INJECTION PRN
Status: DISCONTINUED | OUTPATIENT
Start: 2023-01-25 | End: 2023-01-26 | Stop reason: HOSPADM

## 2023-01-25 RX ORDER — FAMOTIDINE 20 MG/1
20 TABLET, FILM COATED ORAL 2 TIMES DAILY
Status: DISCONTINUED | OUTPATIENT
Start: 2023-01-25 | End: 2023-01-26 | Stop reason: HOSPADM

## 2023-01-25 RX ORDER — LOPERAMIDE HYDROCHLORIDE 2 MG/1
2 CAPSULE ORAL 2 TIMES DAILY PRN
Status: DISCONTINUED | OUTPATIENT
Start: 2023-01-25 | End: 2023-01-26 | Stop reason: HOSPADM

## 2023-01-25 RX ORDER — FENTANYL CITRATE 50 UG/ML
50 INJECTION, SOLUTION INTRAMUSCULAR; INTRAVENOUS
Status: DISCONTINUED | OUTPATIENT
Start: 2023-01-25 | End: 2023-01-25 | Stop reason: HOSPADM

## 2023-01-25 RX ORDER — ACETAMINOPHEN 325 MG/1
650 TABLET ORAL EVERY 6 HOURS
Status: DISCONTINUED | OUTPATIENT
Start: 2023-01-25 | End: 2023-01-26 | Stop reason: HOSPADM

## 2023-01-25 RX ORDER — AMLODIPINE BESYLATE 10 MG/1
10 TABLET ORAL DAILY
Status: DISCONTINUED | OUTPATIENT
Start: 2023-01-26 | End: 2023-01-26 | Stop reason: HOSPADM

## 2023-01-25 RX ORDER — SENNA AND DOCUSATE SODIUM 50; 8.6 MG/1; MG/1
1 TABLET, FILM COATED ORAL 2 TIMES DAILY
Status: DISCONTINUED | OUTPATIENT
Start: 2023-01-25 | End: 2023-01-26 | Stop reason: HOSPADM

## 2023-01-25 RX ORDER — PROPOFOL 10 MG/ML
INJECTION, EMULSION INTRAVENOUS PRN
Status: DISCONTINUED | OUTPATIENT
Start: 2023-01-25 | End: 2023-01-25 | Stop reason: SDUPTHER

## 2023-01-25 RX ORDER — SCOLOPAMINE TRANSDERMAL SYSTEM 1 MG/1
1 PATCH, EXTENDED RELEASE TRANSDERMAL
Status: DISCONTINUED | OUTPATIENT
Start: 2023-01-25 | End: 2023-01-25 | Stop reason: HOSPADM

## 2023-01-25 RX ADMIN — ATORVASTATIN CALCIUM 20 MG: 20 TABLET, FILM COATED ORAL at 17:12

## 2023-01-25 RX ADMIN — FENTANYL CITRATE 50 MCG: 50 INJECTION, SOLUTION INTRAMUSCULAR; INTRAVENOUS at 11:12

## 2023-01-25 RX ADMIN — Medication 50 MG: at 09:42

## 2023-01-25 RX ADMIN — PHENYLEPHRINE HYDROCHLORIDE 200 MCG: 10 INJECTION INTRAVENOUS at 09:01

## 2023-01-25 RX ADMIN — ROCURONIUM BROMIDE 30 MG: 10 INJECTION INTRAVENOUS at 08:56

## 2023-01-25 RX ADMIN — DEXAMETHASONE SODIUM PHOSPHATE 10 MG: 10 INJECTION INTRAMUSCULAR; INTRAVENOUS at 09:22

## 2023-01-25 RX ADMIN — CITALOPRAM 40 MG: 20 TABLET, FILM COATED ORAL at 17:12

## 2023-01-25 RX ADMIN — Medication 2000 MG: at 17:12

## 2023-01-25 RX ADMIN — GLYCOPYRROLATE 0.2 MG: 0.2 INJECTION INTRAMUSCULAR; INTRAVENOUS at 11:08

## 2023-01-25 RX ADMIN — SODIUM CHLORIDE, PRESERVATIVE FREE 10 ML: 5 INJECTION INTRAVENOUS at 20:44

## 2023-01-25 RX ADMIN — ROCURONIUM BROMIDE 20 MG: 10 INJECTION INTRAVENOUS at 09:41

## 2023-01-25 RX ADMIN — Medication 2 G: at 08:57

## 2023-01-25 RX ADMIN — GLYCOPYRROLATE 0.2 MG: 0.2 INJECTION INTRAMUSCULAR; INTRAVENOUS at 09:03

## 2023-01-25 RX ADMIN — METFORMIN HYDROCHLORIDE 500 MG: 500 TABLET ORAL at 17:12

## 2023-01-25 RX ADMIN — FAMOTIDINE 20 MG: 20 TABLET, FILM COATED ORAL at 20:42

## 2023-01-25 RX ADMIN — SODIUM CHLORIDE, POTASSIUM CHLORIDE, SODIUM LACTATE AND CALCIUM CHLORIDE 1000 ML: 600; 310; 30; 20 INJECTION, SOLUTION INTRAVENOUS at 07:33

## 2023-01-25 RX ADMIN — ROCURONIUM BROMIDE 15 MG: 10 INJECTION INTRAVENOUS at 10:16

## 2023-01-25 RX ADMIN — FENTANYL CITRATE 50 MCG: 50 INJECTION, SOLUTION INTRAMUSCULAR; INTRAVENOUS at 10:50

## 2023-01-25 RX ADMIN — LIDOCAINE HYDROCHLORIDE 50 MG: 20 INJECTION, SOLUTION INTRAVENOUS at 08:21

## 2023-01-25 RX ADMIN — MIDAZOLAM 2 MG: 1 INJECTION INTRAMUSCULAR; INTRAVENOUS at 08:20

## 2023-01-25 RX ADMIN — SODIUM CHLORIDE: 9 INJECTION, SOLUTION INTRAVENOUS at 15:47

## 2023-01-25 RX ADMIN — SODIUM CHLORIDE, SODIUM LACTATE, POTASSIUM CHLORIDE, CALCIUM CHLORIDE: 600; 310; 30; 20 INJECTION, SOLUTION INTRAVENOUS at 08:30

## 2023-01-25 RX ADMIN — PHENYLEPHRINE HYDROCHLORIDE 200 MCG: 10 INJECTION INTRAVENOUS at 09:14

## 2023-01-25 RX ADMIN — ROCURONIUM BROMIDE 50 MG: 10 INJECTION INTRAVENOUS at 08:22

## 2023-01-25 RX ADMIN — PROPOFOL 200 MG: 10 INJECTION, EMULSION INTRAVENOUS at 08:21

## 2023-01-25 RX ADMIN — ROCURONIUM BROMIDE 20 MG: 10 INJECTION INTRAVENOUS at 10:00

## 2023-01-25 RX ADMIN — SUGAMMADEX 240 MG: 100 INJECTION, SOLUTION INTRAVENOUS at 11:47

## 2023-01-25 RX ADMIN — Medication 50 MCG/MIN: at 09:09

## 2023-01-25 RX ADMIN — SODIUM CHLORIDE, POTASSIUM CHLORIDE, SODIUM LACTATE AND CALCIUM CHLORIDE: 600; 310; 30; 20 INJECTION, SOLUTION INTRAVENOUS at 08:12

## 2023-01-25 RX ADMIN — SODIUM CHLORIDE, POTASSIUM CHLORIDE, SODIUM LACTATE AND CALCIUM CHLORIDE: 600; 310; 30; 20 INJECTION, SOLUTION INTRAVENOUS at 10:41

## 2023-01-25 RX ADMIN — FENTANYL CITRATE 100 MCG: 50 INJECTION, SOLUTION INTRAMUSCULAR; INTRAVENOUS at 08:21

## 2023-01-25 RX ADMIN — PHENYLEPHRINE HYDROCHLORIDE 100 MCG: 10 INJECTION INTRAVENOUS at 08:29

## 2023-01-25 RX ADMIN — ONDANSETRON 4 MG: 2 INJECTION INTRAMUSCULAR; INTRAVENOUS at 11:10

## 2023-01-25 ASSESSMENT — PAIN DESCRIPTION - DESCRIPTORS: DESCRIPTORS: ACHING;DULL

## 2023-01-25 ASSESSMENT — PAIN - FUNCTIONAL ASSESSMENT: PAIN_FUNCTIONAL_ASSESSMENT: 0-10

## 2023-01-25 ASSESSMENT — PAIN SCALES - GENERAL: PAINLEVEL_OUTOF10: 0

## 2023-01-25 NOTE — INTERVAL H&P NOTE
Pt Name: Sierra Martin  MRN: [id number]  YOB: 1970  Date of evaluation: 1/25/2023    I have reviewed the patient's history and physical examination completed in pre-admission testing on 1/11/2023. No changes to history or on examination today, unless noted below. None.     COLTON Dial CNP  1/25/23  7:48 AM

## 2023-01-25 NOTE — PROGRESS NOTES
Neurosurgery Post op Progress Note      SUBJECTIVE: Status post T2-3 laminectomy. Patient seen while in recovery. Patient's pain appears to be well controlled. He denies any headache, nausea or emesis. OBJECTIVE      Physical exam   VITALS:    Vitals:    01/25/23 1208   BP: 138/80   Pulse: 89   Resp: 12   Temp: (!) 96.7 °F (35.9 °C)   SpO2: 99%     INTAKE:    Intake/Output Summary (Last 24 hours) at 1/25/2023 1221  Last data filed at 1/25/2023 1138  Gross per 24 hour   Intake 2450 ml   Output 270 ml   Net 2180 ml        URINARY CATHETER OUTPUT (Gomez): No Gomez. DRAIN/TUBE OUTPUT: Hemovac drain in place. Very scant output. No evidence of DVT seen on physical exam.  Negative Dakota's sign. No cords or calf tenderness. Neurological exam reveals Awake and alert, Responds to voice, and Responds to tactile stimuli  alert, oriented x3, affect appropriate, no focal neurological deficits, moves all extremities well, no involuntary movements, and reflexes at knee and ankle intact  alert, oriented, normal speech, no focal findings or movement disorder noted, screening mental status exam normal, neck supple without rigidity, cranial nerves II through XII intact. the upper and lower extremities normal 5/5 strength in all tested muscle groups, no muscle wasting or atrophy, no fasciculations noted, no involuntary movements, no abnormalities of position, and normal resting muscle tone  no sensory deficits noted        Wound   Post op wound:  posterior thoracic spine   Dressing appears to be clean, dry, and intact. Hemovac drain in place and working. Scant drainage output. Incision site to the back closed with dermabond .     Data      LABS:   Lab Results   Component Value Date    WBC 9.8 01/11/2023    HGB 15.8 01/11/2023    HCT 47.8 01/11/2023    MCV 93.4 01/11/2023     01/11/2023    No results found for: NA, K, CL, CO2  Lab Results   Component Value Date    BUN 12 01/11/2023      Lab Results Component Value Date    CREATININE 0.74 01/25/2023          ASSESSMENT AND PLAN  1. Advance activity as tolerated. 2.  Diabetic diet. Closely monitor blood sugars. 3.  Lovenox postop day 2.  4.  Continue antibiotics for 24 hours. 5.  No new imaging. 6.  Appropriate analgesics for pain. 7. Continue to closely monitor neurologic status throughout his hospital stay.

## 2023-01-25 NOTE — BRIEF OP NOTE
Brief Postoperative Note      Patient: Gerard Collado  YOB: 1970  MRN: [id number]    Date of Procedure: 1/25/2023    Pre-Op Diagnosis: THORACIC MYELOPATHY    Post-Op Diagnosis: Same       Procedure(s):  T2-3 LAMINECTOMY    Surgeon(s):  Freda Up DO    Assistant:  Physician Assistant: Smitha Sterling PA-C    Anesthesia: General    Estimated Blood Loss (mL): less than 794     Complications: None    Specimens:   * No specimens in log *    Implants:  * No implants in log *      Drains:   Closed/Suction Drain Left;Superior Back Bulb (Active)       NG/OG/NJ/NE Tube Orogastric 18 fr Center mouth (Active)       [REMOVED] Urinary Catheter 01/25/23 Gomez (Removed)       Findings: severe stenosis, calcified ligamentum flavum     Electronically signed by Freda Up DO on 1/25/2023 at 12:32 PM

## 2023-01-25 NOTE — ANESTHESIA POSTPROCEDURE EVALUATION
Department of Anesthesiology  Postprocedure Note    Patient: Nicole Esquivel  MRN: [id number]  YOB: 1970  Date of evaluation: 1/25/2023      Procedure Summary     Date: 01/25/23 Room / Location: [address]    Anesthesia Start: 8451 Anesthesia Stop: 8045    Procedure: T2-3 LAMINECTOMY (Spine Thoracic) Diagnosis:       Thoracic myelopathy      (THORACIC MYELOPATHY)    Surgeons: Costa Guevara DO Responsible Provider: Flint Soulier, MD    Anesthesia Type: general ASA Status: 3          Anesthesia Type: No value filed.     Andre Phase I: Andre Score: 9    Andre Phase II:        Anesthesia Post Evaluation    Patient location during evaluation: PACU  Patient participation: complete - patient participated  Level of consciousness: awake  Pain score: 1  Airway patency: patent  Nausea & Vomiting: no nausea and no vomiting  Complications: no  Cardiovascular status: blood pressure returned to baseline and hemodynamically stable  Respiratory status: acceptable  Hydration status: euvolemic

## 2023-01-25 NOTE — ANESTHESIA PRE PROCEDURE
Department of Anesthesiology  Preprocedure Note       Name:  Zenaida Rubin   Age:  47 y.o.  :  1970                                          MRN:  [id number]         Date:  2023      Surgeon: Krissy Kohler):  Cheng Gallegos DO    Procedure: Procedure(s):  T2-3 LAMINECTOMY  (MARIE FRAME, PRONE, C-ARM, MICROSCOPE)    Medications prior to admission:   Prior to Admission medications    Medication Sig Start Date End Date Taking? Authorizing Provider   lisinopril (PRINIVIL;ZESTRIL) 20 MG tablet TAKE 1 TABLET BY MOUTH EVERY DAY IN THE MORNING 23   SAMANTA Powers   loperamide (IMODIUM) 2 MG capsule TAKE 1 CAPSULE BY MOUTH 2 TIMES DAILY AS NEEDED FOR DIARRHEA 23   SAMANTA Powers   aspirin 81 MG EC tablet Take 81 mg by mouth daily    Historical Provider, MD   atorvastatin (LIPITOR) 20 MG tablet TAKE 1 TABLET BY MOUTH EVERY DAY 1/3/23   SAMANTA Powers   metFORMIN (GLUCOPHAGE) 500 MG tablet TAKE 1 TABLET BY MOUTH IN THE MORNING AND 1 TABLET IN THE EVENING. TAKE WITH MEALS. Patient taking differently: Take 500 mg by mouth daily (with breakfast) 22   SAMANTA Powers   amLODIPine (NORVASC) 10 MG tablet TAKE 1 TABLET BY MOUTH EVERY DAY IN THE MORNING 22   SAMANTA Powers   citalopram (CELEXA) 40 MG tablet TAKE 1 TABLET BY MOUTH EVERY DAY IN THE MORNING 22   SAMANTA Powers   buPROPion (WELLBUTRIN XL) 150 MG extended release tablet Take 1 tablet by mouth every morning 22   SAMANTA Powers       Current medications:    No current facility-administered medications for this encounter.        Allergies:  No Known Allergies    Problem List:    Patient Active Problem List   Diagnosis Code    Controlled type 2 diabetes mellitus with complication, without long-term current use of insulin (HCC) E11.8    Essential (primary) hypertension I10    Anxiety F41.9    ZEFERINO (obstructive sleep apnea) G47.33    Cervical radiculopathy M54.12    Herniated lumbar intervertebral disc M51.26    Thoracic stenosis M48.04    Cervical myelopathy (HCC) G95.9    Chronic obstructive pulmonary disease (HCC) J44.9    Thoracic myelopathy M47.14    Major depressive disorder, recurrent, moderate (HCC) F33.1    PTSD (post-traumatic stress disorder) F43.10    Cannabis use disorder, mild, abuse F12.10    Alcohol use disorder, mild, abuse F10.10       Past Medical History:        Diagnosis Date    Chronic back pain     COPD [facility]chronic obstructive pulmonary disease) [facility])     has not seen a pulmonologist in 2 years. Has improved with weight loss    Depression     Diabetes mellitus ([address]     Diarrhea     Hyperlipidemia     Hypertension     Lumbar herniated disc     ZEFERINO (obstructive sleep apnea)     does not use cpap    PTSD (post-traumatic stress disorder)     Thoracic myelopathy     Wellness examination 10/2022    [facility]       Past Surgical History:        Procedure Laterality Date    APPENDECTOMY      JOINT REPLACEMENT Right 2020    knee    SEPTOPLASTY         Social History:    Social History     Tobacco Use    Smoking status: Former     Packs/day: 0.50     Years: 10.00     Pack years: 5.00     Types: Cigarettes     Quit date:      Years since quittin.0    Smokeless tobacco: Never   Substance Use Topics    Alcohol use: Yes     Comment: 2 times a week                                Counseling given: Not Answered      Vital Signs (Current): There were no vitals filed for this visit.                                            BP Readings from Last 3 Encounters:   23 113/76   22 (!) 160/87   22 118/82       NPO Status:                                                                                 BMI:   Wt Readings from Last 3 Encounters:   23 248 lb (112.5 kg)   22 268 lb (121.6 kg)   22 271 lb (122.9 kg)     There is no height or weight on file to calculate BMI.    CBC:   Lab Results   Component Value Date/Time    WBC 9.8 01/11/2023 10:18 AM    RBC 5.12 01/11/2023 10:18 AM    HGB 15.8 01/11/2023 10:18 AM    HCT 47.8 01/11/2023 10:18 AM    MCV 93.4 01/11/2023 10:18 AM    RDW 12.4 01/11/2023 10:18 AM     01/11/2023 10:18 AM       CMP:   Lab Results   Component Value Date/Time    BUN 12 01/11/2023 10:18 AM    CREATININE 0.76 01/11/2023 10:18 AM    LABGLOM >60 01/11/2023 10:18 AM    GLUCOSE 103 01/11/2023 10:18 AM       POC Tests: No results for input(s): POCGLU, POCNA, POCK, POCCL, POCBUN, POCHEMO, POCHCT in the last 72 hours. Coags:   Lab Results   Component Value Date/Time    PROTIME 11.5 01/11/2023 10:18 AM    INR 1.1 01/11/2023 10:18 AM    APTT 23.9 01/11/2023 10:18 AM       HCG (If Applicable): No results found for: PREGTESTUR, PREGSERUM, HCG, HCGQUANT     ABGs: No results found for: PHART, PO2ART, NYP3CYH, AKQ9IKF, BEART, O0FQNERT     Type & Screen (If Applicable):  No results found for: LABABO, LABRH    Drug/Infectious Status (If Applicable):  No results found for: HIV, HEPCAB    COVID-19 Screening (If Applicable): No results found for: COVID19        Anesthesia Evaluation  Patient summary reviewed no history of anesthetic complications:   Airway: Mallampati: II          Dental:          Pulmonary:normal exam  breath sounds clear to auscultation  (+) COPD:  sleep apnea: on noncompliant,                             Cardiovascular:    (+) hypertension:,         Rhythm: regular  Rate: normal                    Neuro/Psych:   (+) neuromuscular disease:, psychiatric history:             ROS comment: Cervical radiculopathy  Herniated lumbar intervertebral disc  Thoracic stenosis  Cervical myelopathy (HCC)     GI/Hepatic/Renal: Neg GI/Hepatic/Renal ROS            Endo/Other:    (+) DiabetesType II DM, , .                 Abdominal:             Vascular: Other Findings:           Anesthesia Plan      general     ASA 3       Induction: intravenous.     MIPS: Postoperative opioids intended, Prophylactic antiemetics administered and Postoperative trial extubation. Anesthetic plan and risks discussed with patient. Plan discussed with CRNA.               Narrative & Impression    Normal sinus rhythm  Normal ECG  No previous ECGs available      Specimen Collected: 01/11/23 10:51 MARKEL Jarrett MD   1/25/2023

## 2023-01-25 NOTE — OP NOTE
Operative Note      Patient: Kelly Bremateusz  YOB: 1970  MRN: [id number]    Date of Procedure: 1/25/2023    Pre-Op Diagnosis: THORACIC MYELOPATHY    Post-Op Diagnosis: Same       Procedure(s):  T2-3 LAMINECTOMY    Surgeon(s):  Ellin Bamberger, DO    Assistant:   Physician Assistant: Hoa Alvarez PA-C    Anesthesia: General    Estimated Blood Loss (mL): less than 928     Complications: None    Specimens:   * No specimens in log *    Implants:  * No implants in log *      Drains:   Closed/Suction Drain Left;Superior Back Bulb (Active)       NG/OG/NJ/NE Tube Orogastric 18 fr Center mouth (Active)       [REMOVED] Urinary Catheter 01/25/23 Gomez (Removed)       Findings:  severe stenosis, calcified ligamentum flavum     Detailed Description of Procedure:     Brief history and indication for surgery: This is a 47year old with thoracic myelopathy . I recommend the above surgery as the best option to improve symptoms. I explained to the patient  the indications for surgery, the expected outcomes, the alternatives surgery, the risk which includes but not limited to bleeding, pain, infection, CSF leak, nerve damage, worsening symptoms,  need for more surgery, anesthesia and medical complications, death. All questions were answered and I was asked to proceed with surgery. Operative details:  Patient was brought in to OR by anesthesiologist.  Glena Gip IVs were secured, the A-line installed. Patient was then induced and intubated. Patient was positioned prone on a michelle frame on a Renny table. The arms were rested in arm pads in anatomical position with elbows flexed at 90 degrees and all points of contact padded. Localizing fluoroscopy was done to plan the incision counting  from 1st rib down. The proposed midline incision was thoroughly scrubbed with alcohol and ChloraPrep. Patient was sterilely draped. Preoperative antibodies given.   Timeout was called and all members operative team agreed to the procedure. The midline incision was infiltrated with lidocaine with epinephrine. Skin was incised with a 10 blade down to the subcutaneous level layer. At the bottom of the incision, a subperiosteal dissection was carried out with Bovie and cisse to expose the T2-3 lamina. Care was taken to not injure the facet capsule. Fluoroscpy was shot to confirm levels. Using a patient with [place], high-speed drill, and pneumatic Kerrison a T2-3 laminectomy was completed. Ligamentum flavum was hypertrophied and calcified carefully removed with Kerrisons and drill. Epidural bleeding was controlled with bipolar and surgiflow and tamponade. There was no CSF leak. Once I was satisfied with the decompression, I started the closure. Wound was thoroughly irrigated multiple times ensure no active bleeding. Exparel was then infiltrated in the deep dermis and the fascial layers. Bipolar was used to control any point bleeding. Vancomycin powder was onlayed onto the subfascial wound. A 10 Vietnamese Hemovac drain was placed subfascially and tunneled subcutaneously to the skin. Patient was then closed in multiple layers in usual fashion. Vancomycin powder was onlayed supra-fascially. Skin was closed with inverted suture and dressed with dermabond. Patient was then turned supine, extubated, and recovered in PACU without complication. Patient tolerated the procedure well without complications     Final count was correct at the end of case.      Electronically signed by Cheng Gallegos DO on 1/25/2023 at 12:33 PM

## 2023-01-26 VITALS
HEIGHT: 75 IN | SYSTOLIC BLOOD PRESSURE: 120 MMHG | BODY MASS INDEX: 30.84 KG/M2 | OXYGEN SATURATION: 96 % | RESPIRATION RATE: 21 BRPM | WEIGHT: 248 LBS | HEART RATE: 63 BPM | TEMPERATURE: 98.3 F | DIASTOLIC BLOOD PRESSURE: 86 MMHG

## 2023-01-26 LAB
ABSOLUTE EOS #: <0.03 K/UL (ref 0–0.44)
ABSOLUTE IMMATURE GRANULOCYTE: 0.08 K/UL (ref 0–0.3)
ABSOLUTE LYMPH #: 2.02 K/UL (ref 1.1–3.7)
ABSOLUTE MONO #: 0.91 K/UL (ref 0.1–1.2)
ANION GAP SERPL CALCULATED.3IONS-SCNC: 9 MMOL/L (ref 9–17)
BASOPHILS # BLD: 0 % (ref 0–2)
BASOPHILS ABSOLUTE: 0.03 K/UL (ref 0–0.2)
BUN BLDV-MCNC: 10 MG/DL (ref 6–20)
CALCIUM SERPL-MCNC: 9.1 MG/DL (ref 8.6–10.4)
CHLORIDE BLD-SCNC: 104 MMOL/L (ref 98–107)
CO2: 25 MMOL/L (ref 20–31)
CREAT SERPL-MCNC: 0.63 MG/DL (ref 0.7–1.2)
EOSINOPHILS RELATIVE PERCENT: 0 % (ref 1–4)
GFR SERPL CREATININE-BSD FRML MDRD: >60 ML/MIN/1.73M2
GLUCOSE BLD-MCNC: 131 MG/DL (ref 70–99)
HCT VFR BLD CALC: 41.6 % (ref 40.7–50.3)
HEMOGLOBIN: 13.5 G/DL (ref 13–17)
IMMATURE GRANULOCYTES: 1 %
LYMPHOCYTES # BLD: 12 % (ref 24–43)
MCH RBC QN AUTO: 31.4 PG (ref 25.2–33.5)
MCHC RBC AUTO-ENTMCNC: 32.5 G/DL (ref 28.4–34.8)
MCV RBC AUTO: 96.7 FL (ref 82.6–102.9)
MONOCYTES # BLD: 6 % (ref 3–12)
NRBC AUTOMATED: 0 PER 100 WBC
PDW BLD-RTO: 12.6 % (ref 11.8–14.4)
PLATELET # BLD: 227 K/UL (ref 138–453)
PMV BLD AUTO: 11.2 FL (ref 8.1–13.5)
POTASSIUM SERPL-SCNC: 4.3 MMOL/L (ref 3.7–5.3)
RBC # BLD: 4.3 M/UL (ref 4.21–5.77)
SEG NEUTROPHILS: 81 % (ref 36–65)
SEGMENTED NEUTROPHILS ABSOLUTE COUNT: 13.55 K/UL (ref 1.5–8.1)
SODIUM BLD-SCNC: 138 MMOL/L (ref 135–144)
WBC # BLD: 16.6 K/UL (ref 3.5–11.3)

## 2023-01-26 PROCEDURE — 6360000002 HC RX W HCPCS: Performed by: PHYSICIAN ASSISTANT

## 2023-01-26 PROCEDURE — 36415 COLL VENOUS BLD VENIPUNCTURE: CPT

## 2023-01-26 PROCEDURE — 97161 PT EVAL LOW COMPLEX 20 MIN: CPT

## 2023-01-26 PROCEDURE — 97530 THERAPEUTIC ACTIVITIES: CPT

## 2023-01-26 PROCEDURE — 97165 OT EVAL LOW COMPLEX 30 MIN: CPT

## 2023-01-26 PROCEDURE — 80048 BASIC METABOLIC PNL TOTAL CA: CPT

## 2023-01-26 PROCEDURE — 6370000000 HC RX 637 (ALT 250 FOR IP): Performed by: PHYSICIAN ASSISTANT

## 2023-01-26 PROCEDURE — APPSS30 APP SPLIT SHARED TIME 16-30 MINUTES: Performed by: REGISTERED NURSE

## 2023-01-26 PROCEDURE — 85025 COMPLETE CBC W/AUTO DIFF WBC: CPT

## 2023-01-26 PROCEDURE — 2580000003 HC RX 258: Performed by: PHYSICIAN ASSISTANT

## 2023-01-26 RX ORDER — METHOCARBAMOL 750 MG/1
750 TABLET, FILM COATED ORAL 4 TIMES DAILY PRN
Qty: 28 TABLET | Refills: 0 | Status: SHIPPED | OUTPATIENT
Start: 2023-01-26 | End: 2023-02-02

## 2023-01-26 RX ORDER — OXYCODONE HYDROCHLORIDE 5 MG/1
5-10 TABLET ORAL EVERY 6 HOURS PRN
Qty: 56 TABLET | Refills: 0 | Status: SHIPPED | OUTPATIENT
Start: 2023-01-26 | End: 2023-02-02

## 2023-01-26 RX ADMIN — LISINOPRIL 20 MG: 20 TABLET ORAL at 09:20

## 2023-01-26 RX ADMIN — ACETAMINOPHEN 650 MG: 325 TABLET ORAL at 09:20

## 2023-01-26 RX ADMIN — FAMOTIDINE 20 MG: 20 TABLET, FILM COATED ORAL at 09:20

## 2023-01-26 RX ADMIN — SODIUM CHLORIDE: 9 INJECTION, SOLUTION INTRAVENOUS at 02:20

## 2023-01-26 RX ADMIN — BUPROPION HYDROCHLORIDE 150 MG: 150 TABLET, EXTENDED RELEASE ORAL at 09:20

## 2023-01-26 RX ADMIN — METFORMIN HYDROCHLORIDE 500 MG: 500 TABLET ORAL at 09:20

## 2023-01-26 RX ADMIN — SODIUM CHLORIDE, PRESERVATIVE FREE 10 ML: 5 INJECTION INTRAVENOUS at 09:21

## 2023-01-26 RX ADMIN — AMLODIPINE BESYLATE 10 MG: 10 TABLET ORAL at 09:20

## 2023-01-26 RX ADMIN — ATORVASTATIN CALCIUM 20 MG: 20 TABLET, FILM COATED ORAL at 09:20

## 2023-01-26 RX ADMIN — CITALOPRAM 40 MG: 20 TABLET, FILM COATED ORAL at 09:20

## 2023-01-26 RX ADMIN — Medication 2000 MG: at 09:20

## 2023-01-26 RX ADMIN — Medication 2000 MG: at 01:01

## 2023-01-26 ASSESSMENT — PAIN SCALES - GENERAL: PAINLEVEL_OUTOF10: 0

## 2023-01-26 NOTE — CARE COORDINATION
Case Management Assessment  Initial Evaluation    Date/Time of Evaluation: 1/26/2023 4:50 PM  Assessment Completed by: Larry Steward RN    If patient is discharged prior to next notation, then this note serves as note for discharge by case management. Patient Name: Jose Cagle                   YOB: 1970  Diagnosis: Thoracic myelopathy [M47.14]  Degenerative thoracic spinal stenosis [M48.04]                   Date / Time: 1/25/2023  5:45 AM    Patient Admission Status: Inpatient   Readmission Risk (Low < 19, Mod (19-27), High > 27): Readmission Risk Score: 6.9    Current PCP: SAMANTA Coello  PCP verified by CM? Yes    Chart Reviewed: Yes      History Provided by: Patient  Patient Orientation: Alert and Oriented    Patient Cognition: Alert    Hospitalization in the last 30 days (Readmission):  No  If yes, Readmission Assessment in CM Navigator will be completed. Advance Directives:      Code Status: Full Code   Patient's Primary Decision Maker is: Legal Next of Kin      Discharge Planning:    Patient lives with: Parent Type of Home: Apartment  Primary Care Giver:    Patient Support Systems include: Parent   Current Financial resources: Medicaid  Current community resources: None  Current services prior to admission: None            Current DME:              Type of Home Care services:  None    ADLS  Prior functional level: Independent in ADLs/IADLs  Current functional level: Independent in ADLs/IADLs    PT AM-PAC: 24 /24  OT AM-PAC: 24 /24    Family can provide assistance at DC: Yes  Would you like Case Management to discuss the discharge plan with any other family members/significant others, and if so, who?  No  Plans to Return to Present Housing: Yes  Other Identified Issues/Barriers to RETURNING to current housing: N/A  Potential Assistance needed at discharge: N/A            Potential DME:    Patient expects to discharge to: [address] [place] [address] for transportation at discharge: Family    Financial    Payor: MEDICAID [place] / Plan: [address] [facility] [employer] DEPT OF JOB / Product Type: *No Product type* /     Does insurance require precert for SNF: Yes    Potential assistance Purchasing Medications:    Meds-to-Beds request: Yes      [id number] [employer] - [place], [place] - [address]  Phone: [phone] Fax: [phone]      Notes:    Factors facilitating achievement of predicted outcomes: Family support, Motivated, Cooperative, and Pleasant    Barriers to discharge: Decreased endurance    Additional Case Management Notes: CM spoke with patient at bedside to discuss transitional planning. CM role explained and patient demographic information verified. Patient states that he will return home at discharge. Patient states that he has transportation home and he verbalizes having no additional transitional needs at this time. The Plan for Transition of Care is related to the following treatment goals of Thoracic myelopathy [M47.14]  Degenerative thoracic spinal stenosis [T34.87]    IF APPLICABLE: The Patient and/or patient representative [place] and his family were provided with a choice of provider and agrees with the discharge plan. Freedom of choice list with basic dialogue that supports the patient's individualized plan of care/goals and shares the quality data associated with the providers was provided to: Patient   Patient Representative Name:       The Patient and/or Patient Representative Agree with the Discharge Plan?  Yes    Citlaly Hankins RN  Case Management Department  Ph: [phone]

## 2023-01-26 NOTE — PROGRESS NOTES
Physical Therapy  Facility/Department: [address] STEPDOWN  Physical Therapy Initial Assessment    Name: Gerard Collado  : 1970  MRN: [id number]  Date of Service: 2023  No chief complaint on file. - s/p T2-3 laminectomy 23   Discharge Recommendations:    No further therapy required at discharge. PT Equipment Recommendations  Equipment Needed: No      Patient Diagnosis(es): There were no encounter diagnoses. Past Medical History:  has a past medical history of Chronic back pain, COPD (chronic obstructive pulmonary disease) ([address] Depression, Diabetes mellitus ([address] Diarrhea, Hyperlipidemia, Hypertension, Lumbar herniated disc, ZEFERINO (obstructive sleep apnea), PTSD (post-traumatic stress disorder), Thoracic myelopathy, and Wellness examination. Past Surgical History:  has a past surgical history that includes joint replacement (Right, ); Septoplasty; Appendectomy; and laminectomy (2023). Assessment   Assessment: Pt amb 300' IND, IND with all transfers and bed mobility. Skilled acute PT not required, PT to sign off  Therapy Prognosis: Excellent  Decision Making: Low Complexity  Requires PT Follow-Up: No  Activity Tolerance  Activity Tolerance: Patient tolerated treatment well     Plan   Physcial Therapy Plan  General Plan: Discharge with evaluation only  Safety Devices  Type of Devices: All fall risk precautions in place, Gait belt, Left in chair, Call light within reach  Restraints  Restraints Initially in Place: No     Restrictions  Restrictions/Precautions  Restrictions/Precautions: General Precautions  Required Braces or Orthoses?: No  Position Activity Restriction  Other position/activity restrictions: amb pt, activity as tolerated.  s/p T2-3 laminectomy 23     Subjective   General  Chart Reviewed: Yes  Patient assessed for rehabilitation services?: Yes  Response To Previous Treatment: Not applicable  Family / Caregiver Present: No  Follows Commands: Within Functional Limits  General Comment  Comments: RN and pt agreeable to PT. pt alert in bed upon arrival. Co-Eval w/ OT  Subjective  Subjective: Pt reports 4/10 LBP, denies n/t         Social/Functional History  Social/Functional History  Lives With: Family (mom)  Type of Home: Apartment (2nd floor)  Home Layout: One level  Home Access: Stairs to enter with rails  Entrance Stairs - Number of Steps: 7 steps  Entrance Stairs - Rails: Right  Bathroom Shower/Tub: Tub/Shower unit  Bathroom Toilet: Standard  Bathroom Equipment: Shower chair  Home Equipment: Veronika Bansal, rolling (pt reported no use of DME at baseline)  ADL Assistance: [address]: Independent  Homemaking Responsibilities: Yes  Meal Prep Responsibility: Primary  Laundry Responsibility: Primary (pt reported using laundromat)  Cleaning Responsibility: Primary  Ambulation Assistance: Independent  Transfer Assistance: Independent  Active : Yes  Mode of Transportation: Truck  Occupation: On disability  Leisure & Hobbies: reading, fishing  Additional Comments: 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  [address]: Impaired  Vision Exceptions: Wears glasses at all times  Hearing  Hearing: Within functional limits    Cognition   Orientation  Overall Orientation Status: Within Functional Limits  Cognition  Overall Cognitive Status: WFL     Objective   AROM RLE (degrees)  RLE AROM: WFL  AROM LLE (degrees)  LLE AROM : WFL  AROM RUE (degrees)  RUE General AROM: See OT  AROM LUE (degrees)  LUE General AROM: See OT  Strength RLE  Strength RLE: WFL  Comment: Pt grossly 5/5, 4+/5 hip flexion  Strength LLE  Strength LLE: WFL  Comment: Pt grossly 5/5, 4+/5 hip flexion  Strength RUE  Comment: See OT  Strength LUE  Comment: See OT           Bed mobility  Bridging: Independent  Supine to Sit: Independent  Bed Mobility Comments: Pt in recliner upon end of eval  Transfers  Sit to Stand: Stand by assistance  Stand to Sit: Stand by assistance  Ambulation  Surface: Level tile  Device: No Device  Assistance: Independent  Quality of Gait: Pt ambulates with adequete gait speed, normal/symetrical step length, no LOB. Distance: 300'  More Ambulation?: No  Stairs/Curb  Stairs?: Yes  Stairs  # Steps : 3  Stairs Height: 8\"  Rails:  (No rail used ascending, L rail used in descent.)  Assistance: Contact guard assistance     Balance  Sitting - Static: Good  Sitting - Dynamic: Good  Standing - Static: Good  Standing - Dynamic: Good       AM-PAC Score  AM-PAC Inpatient Mobility Raw Score : 24 (01/26/23 1205)  AM-PAC Inpatient T-Scale Score : 61.14 (01/26/23 1205)  Mobility Inpatient CMS 0-100% Score: 0 (01/26/23 1205)  Mobility Inpatient CMS G-Code Modifier : [address] (01/26/23 1205)     Goals  Short Term Goals  Time Frame for Short Term Goals: D/C PT     Education  Patient Education  Education Given To: Patient  Education Provided: Role of Therapy;Plan of Care  Education Method: Demonstration  Barriers to Learning: None  Education Outcome: Verbalized understanding;Demonstrated understanding    Therapy Time   Individual Concurrent Group Co-treatment   Time In 1014         Time Out 1029         Minutes 15         Timed Code Treatment Minutes: 8 Minutes       HUNTER Mccabe   This treatment/evaluation completed by signing SPT. Signing PT agrees with treatment and documentation.

## 2023-01-26 NOTE — DISCHARGE INSTRUCTIONS
Thoracic Spine Surgery Discharge Instructions     Thank you for choosing [facility] and [facility] for your surgical needs. The following instructions will help to ensure your comfort and that you are well prepared after your surgery. Post-Operative Visit:   The office is located at:    [address], [address]    [facility], [address] [facility], main floor    [facility], [address] [place] [address] Street    [phone]     Please also call your primary care physician to schedule an appointment for further evaluation and care. Diet:   You may resume your regular diet. Be sure to eat a well-balanced diet. Protein promotes wound healing. Pain medication and decreased activity can cause constipation. Drink 8-10 glasses of water a day, eat fresh fruits and vegetables, and add prunes, raisins and bran cereals to your diet if you do become constipated. A stool softener taken 1-2 times a day is helpful. Dulcolax suppositories or Fleets enemas are also available without a prescription. Call our office if the problem continues. Activity and Exercise:   No driving until you are seen in the office. Avoid riding in a car for the first two weeks until you come to the office for your scheduled follow-up. Start taking short, frequent walks in the beginning. [place], more frequent walks throughout the day are more beneficial than one long walk each day. You may gradually increase the distance; as tolerated. Your brace will help give support to your muscles while you walk. If your pain increases, you may be walking too much or too far. Try backing off for a day or two and then resume slowly. No lifting greater than 5 lbs (gallon of milk). No bending at the waist. Instead, bend at the knees and squat to pick something up. If physical therapy has been prescribed, you are not to perform range of motion, flexion, extension or lateral bending.    No baths, swimming or hot tub until you discuss this with your doctor. Incision Care and Hygiene: Your incision may be may be closed with sutures Steri-strips, staples, or glue. - The Steri-strips will fall off on their own in 7-10 days    - The staples or sutures should be removed about 2 weeks after surgery. If they are not removed please call the clinic to have them removed. - The glue will dissolve over time   No ointments or lotions on the incision   It is OK to shower 3-4 days after surgery. Let water run over the incision. Gently pat the incision dry with a clean towel, do not rub. Leave incision site open to air. Pain Management:   Do not take NSAID medications (Ibuprofen, Naprosyn, etc.) or Cotto-2 inhibitors (Celebrex, etc.) for 2 weeks following surgery. You will be given a prescription for pain medication. Our hope is that you will eventually be weaned off all pain medications. Try not take the pain medicine unless you need to. If you feel that you do not need something that strong, you may use regular or extra-strength Tylenol instead. DO NOT drink alcohol, drive or operate heavy machinery while taking your pain medications. Notify the office if your pain is not controlled or you need a medication refill before your appointment. YOU SHOULD CALL THE OFFICE -[phone] IF YOU HAVE ANY OF THE FOLLOWING:   Increased pain or pain not relieved with current medications   If you notice any signs of infection such as bleeding, redness, swelling, tenderness, odor, drainage or opening of the incision. Please check your incisions twice daily. Fevers greater than 101.5 degrees   Flu-like symptoms, chills, shakes, chest pain, shortness of breath, nausea, vomiting, diarrhea   New or increased pain, numbness or tingling in the legs, as well as new or increased balance or coordination issues.    New difficulty with urinating or holding your bladder or your bowels     *If you are unable to contact someone at the office and your symptoms persist or increase, call 911 or go to the emergency department.

## 2023-01-26 NOTE — PROGRESS NOTES
Neurosurgery CORINNE/Resident    Daily Progress Note   No chief complaint on file. 1/26/2023  9:58 AM    Chart reviewed. No acute events overnight. No new complaints. Pain well controlled. Tolerating oral diet. Voiding. Ambulating with minimal assistance. Vitals:    01/25/23 2359 01/26/23 0400 01/26/23 0914 01/26/23 0925   BP: (!) 143/95 (!) 140/96 125/89    Pulse: 66 77 63 70   Resp: 18 13 18    Temp: 98.6 °F (37 °C) 97.6 °F (36.4 °C) 97.7 °F (36.5 °C)    TempSrc: Oral Oral Oral    SpO2: 96% 95% 97%    Weight:       Height:             PE: AOx3   Motor   L deltoid 5/5; R deltoid 5/5  L biceps 5/5; R biceps 5/5  L triceps 5/5; R triceps 5/5  L intrinsics 5/5; R intrinsics 5/5      L iliopsoas 5/5 , R iliopsoas 5/5  L quadriceps 5/5; R quadriceps 5/5  L Dorsiflexion 5/5; R dorsiflexion 5/5  L Plantarflexion 5/5; R plantarflexion 5/5  L EHL 5/5; R EHL 5/5    Sensation intact    Drain output 30 ml/12h, 98 ml/24h  Incision thoracic dressing dry and intact      Lab Results   Component Value Date    WBC 16.6 (H) 01/26/2023    HGB 13.5 01/26/2023    HCT 41.6 01/26/2023     01/26/2023    HDL 36 08/16/2022     01/26/2023    K 4.3 01/26/2023     01/26/2023    CREATININE 0.63 (L) 01/26/2023    BUN 10 01/26/2023    CO2 25 01/26/2023    INR 1.1 01/11/2023    LABA1C 5.4 11/02/2022       A/P  47 y.o. male who presents with thoracic myelopathy  POD 1 s/p T2-3 laminectomy     - continue HV drain, possible removal later today   - activity as tolerated, okay PT and OT  - diet as tolerated  - pain control, bowel regimen  - encourage IS   - discharge later today once drain removed        Please contact neurosurgery with any changes in patients neurologic status.        Deepak Carvajal CNP  1/26/23  9:58 AM

## 2023-01-26 NOTE — PROGRESS NOTES
Occupational Therapy  [facility] [address] [facility]  Occupational Therapy Initial Assessment    Name: Tolu Brown  : 1970  MRN: [id number]  Date of Service: 2023    Discharge Recommendations:   No occupational therapy recommended at discharge      Patient Diagnosis(es): There were no encounter diagnoses. Past Medical History:  has a past medical history of Chronic back pain, COPD (chronic obstructive pulmonary disease) ([facility] [address] Depression, Diabetes mellitus ([facility] [address] Diarrhea, Hyperlipidemia, Hypertension, Lumbar herniated disc, ZEFERINO (obstructive sleep apnea), PTSD (post-traumatic stress disorder), Thoracic myelopathy, and Wellness examination. Past Surgical History:  has a past surgical history that includes joint replacement (Right, ); Septoplasty; Appendectomy; and laminectomy (2023). Assessment   Assessment: pt demonstrated ability to safely and independently complete ADLs and functional transfers/functional mobility. pt with no acute OT needs at this time, please re-order if future needs arise. Prognosis: Good  Decision Making: Low Complexity  REQUIRES OT FOLLOW-UP: No  Activity Tolerance  Activity Tolerance: Patient Tolerated treatment well        Plan   Occupational Therapy Plan  Times Per Week: D/C OT     Restrictions  Restrictions/Precautions  Restrictions/Precautions: General Precautions  Required Braces or Orthoses?: No  Position Activity Restriction  Other position/activity restrictions: ambulate pt, activity as tolerated, T2-3 laminectomy    Subjective   General  Patient assessed for rehabilitation services?: Yes  Family / Caregiver Present: No  General Comment  Comments: RN ok'd for therapy this morning. pt agreeable to participate in session and cooperative/pleasant throughout.  pt reported 4/10 back pain     Social/Functional History  Social/Functional History  Lives With: Family (mom)  Type of Home: Apartment (2nd floor)  Home Layout: One level  Home Access: Stairs to enter with rails  Entrance Stairs - Number of Steps: 7 steps  Entrance Stairs - Rails: Right  Bathroom Shower/Tub: Tub/Shower unit  Bathroom Toilet: Standard  Bathroom Equipment: Shower chair  Home Equipment: robin Santoro (pt reported no use of DME at baseline)  ADL Assistance: [address]: Independent  Homemaking Responsibilities: Yes  Meal Prep Responsibility: Primary  Laundry Responsibility: Primary (pt reported using laundromat)  Cleaning Responsibility: Primary  Ambulation Assistance: Independent  Transfer Assistance: Independent  Active : Yes  Mode of Transportation: Truck  Occupation: On disability  Leisure & Hobbies: reading, fishing  Additional Comments: 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 pt completed functional mobility in hallway to simulate household distances with no difficulties)  Transfer Training  Transfer Training: Yes  Overall Level of Assistance: Modified independent  Sit to Stand: Modified independent  Stand to Sit: Modified independent  Gait  Overall Level of Assistance: Modified independent       AROM: Within functional limits  Strength:  Within functional limits  Coordination: Within functional limits  Tone: Normal  Sensation: Intact    ADL  Feeding: Independent  Grooming: Independent  UE Bathing: Independent  LE Bathing: Modified independent   UE Dressing: Independent  LE Dressing: Modified independent   Toileting: Modified independent   Additional Comments: pt adjusted B socks while sitting on EOB             Vision  Vision: Impaired  Vision Exceptions: Wears glasses at all times  Hearing  Hearing: Within functional limits    Cognition  Overall Cognitive Status: WFL  Orientation  Overall Orientation Status: Within Functional Limits                    Education Given To: Patient  Education Provided: Role of Therapy;Plan of Care  Education Method: Verbal  Barriers to Learning: None  Education Outcome: Verbalized understanding    LUE AROM (degrees)  LUE AROM : WFL  Left Hand AROM (degrees)  Left Hand AROM: WF  RUE AROM (degrees)  RUE AROM : [facility]  Right Hand AROM (degrees)  Right Hand AROM: [facility]                                                                     AM-PAC Score        AM-PAC Inpatient Daily Activity Raw Score: 24 (01/26/23 1414)  AM-PAC Inpatient ADL T-Scale Score : 57.54 (01/26/23 1414)  ADL Inpatient CMS 0-100% Score: 0 (01/26/23 1414)  ADL Inpatient CMS G-Code Modifier : CH (01/26/23 1414)       Therapy Time   Individual Concurrent Group Co-treatment   Time In [facility]         Time Out 1029         Minutes [address], OTR/L

## 2023-01-26 NOTE — PLAN OF CARE
Problem: Discharge Planning  Goal: Discharge to home or other facility with appropriate resources  1/26/2023 0621 by Rosalia Melvin RN  Outcome: Progressing  1/25/2023 1840 by Eli Leonard RN  Outcome: Progressing     Problem: Pain  Goal: Verbalizes/displays adequate comfort level or baseline comfort level  1/26/2023 0621 by Rosalia Melvin RN  Outcome: Progressing  1/25/2023 1840 by Eli Leonard RN  Outcome: Progressing     Problem: ABCDS Injury Assessment  Goal: Absence of physical injury  1/26/2023 1060 by Rosalia Melvin RN  Outcome: Progressing  1/25/2023 1840 by Eli Loenard RN  Outcome: Progressing     Problem: Skin/Tissue Integrity  Goal: Absence of new skin breakdown  Description: 1. Monitor for areas of redness and/or skin breakdown  2. Assess vascular access sites hourly  3. Every 4-6 hours minimum:  Change oxygen saturation probe site  4. Every 4-6 hours:  If on nasal continuous positive airway pressure, respiratory therapy assess nares and determine need for appliance change or resting period.   Outcome: Progressing     Problem: Skin/Tissue Integrity - Adult  Goal: Skin integrity remains intact  Outcome: Progressing  Goal: Incisions, wounds, or drain sites healing without S/S of infection  Outcome: Progressing  Goal: Oral mucous membranes remain intact  Outcome: Progressing     Problem: Musculoskeletal - Adult  Goal: Return mobility to safest level of function  Outcome: Progressing  Goal: Maintain proper alignment of affected body part  Outcome: Progressing  Goal: Return ADL status to a safe level of function  Outcome: Progressing     Problem: Infection - Adult  Goal: Absence of infection at discharge  Outcome: Progressing  Goal: Absence of infection during hospitalization  Outcome: Progressing

## 2023-01-30 DIAGNOSIS — I10 ESSENTIAL (PRIMARY) HYPERTENSION: ICD-10-CM

## 2023-01-30 DIAGNOSIS — F41.9 ANXIETY: ICD-10-CM

## 2023-01-30 DIAGNOSIS — E11.8 CONTROLLED TYPE 2 DIABETES MELLITUS WITH COMPLICATION, WITHOUT LONG-TERM CURRENT USE OF INSULIN (HCC): ICD-10-CM

## 2023-01-31 RX ORDER — AMLODIPINE BESYLATE 10 MG/1
TABLET ORAL
Qty: 30 TABLET | Refills: 1 | Status: SHIPPED | OUTPATIENT
Start: 2023-01-31

## 2023-01-31 RX ORDER — CITALOPRAM 40 MG/1
TABLET ORAL
Qty: 30 TABLET | Refills: 1 | Status: SHIPPED | OUTPATIENT
Start: 2023-01-31

## 2023-02-06 ENCOUNTER — OFFICE VISIT (OUTPATIENT)
Dept: PRIMARY CARE CLINIC | Age: 53
End: 2023-02-06
Payer: MEDICAID

## 2023-02-06 VITALS
BODY MASS INDEX: 32.5 KG/M2 | WEIGHT: 261.4 LBS | HEART RATE: 74 BPM | DIASTOLIC BLOOD PRESSURE: 80 MMHG | SYSTOLIC BLOOD PRESSURE: 126 MMHG | HEIGHT: 75 IN | OXYGEN SATURATION: 94 %

## 2023-02-06 DIAGNOSIS — G95.9 CERVICAL MYELOPATHY (HCC): Primary | ICD-10-CM

## 2023-02-06 DIAGNOSIS — J44.9 CHRONIC OBSTRUCTIVE PULMONARY DISEASE, UNSPECIFIED COPD TYPE (HCC): ICD-10-CM

## 2023-02-06 DIAGNOSIS — Z23 NEED FOR VACCINATION: ICD-10-CM

## 2023-02-06 DIAGNOSIS — I10 ESSENTIAL (PRIMARY) HYPERTENSION: ICD-10-CM

## 2023-02-06 DIAGNOSIS — J44.9 OSA AND COPD OVERLAP SYNDROME (HCC): ICD-10-CM

## 2023-02-06 DIAGNOSIS — F33.1 MAJOR DEPRESSIVE DISORDER, RECURRENT, MODERATE (HCC): ICD-10-CM

## 2023-02-06 DIAGNOSIS — G47.33 OSA AND COPD OVERLAP SYNDROME (HCC): ICD-10-CM

## 2023-02-06 DIAGNOSIS — E11.8 CONTROLLED TYPE 2 DIABETES MELLITUS WITH COMPLICATION, WITHOUT LONG-TERM CURRENT USE OF INSULIN (HCC): ICD-10-CM

## 2023-02-06 LAB — HBA1C MFR BLD: 5.5 %

## 2023-02-06 PROCEDURE — 99214 OFFICE O/P EST MOD 30 MIN: CPT | Performed by: PHYSICIAN ASSISTANT

## 2023-02-06 PROCEDURE — 3044F HG A1C LEVEL LT 7.0%: CPT | Performed by: PHYSICIAN ASSISTANT

## 2023-02-06 PROCEDURE — 83036 HEMOGLOBIN GLYCOSYLATED A1C: CPT | Performed by: PHYSICIAN ASSISTANT

## 2023-02-06 PROCEDURE — 3074F SYST BP LT 130 MM HG: CPT | Performed by: PHYSICIAN ASSISTANT

## 2023-02-06 PROCEDURE — 90471 IMMUNIZATION ADMIN: CPT | Performed by: PHYSICIAN ASSISTANT

## 2023-02-06 PROCEDURE — 90750 HZV VACC RECOMBINANT IM: CPT | Performed by: PHYSICIAN ASSISTANT

## 2023-02-06 PROCEDURE — 3079F DIAST BP 80-89 MM HG: CPT | Performed by: PHYSICIAN ASSISTANT

## 2023-02-06 ASSESSMENT — PATIENT HEALTH QUESTIONNAIRE - PHQ9
2. FEELING DOWN, DEPRESSED OR HOPELESS: 0
SUM OF ALL RESPONSES TO PHQ QUESTIONS 1-9: 0
1. LITTLE INTEREST OR PLEASURE IN DOING THINGS: 0
SUM OF ALL RESPONSES TO PHQ QUESTIONS 1-9: 0
3. TROUBLE FALLING OR STAYING ASLEEP: 0
6. FEELING BAD ABOUT YOURSELF - OR THAT YOU ARE A FAILURE OR HAVE LET YOURSELF OR YOUR FAMILY DOWN: 0
5. POOR APPETITE OR OVEREATING: 0
SUM OF ALL RESPONSES TO PHQ9 QUESTIONS 1 & 2: 0
9. THOUGHTS THAT YOU WOULD BE BETTER OFF DEAD, OR OF HURTING YOURSELF: 0
4. FEELING TIRED OR HAVING LITTLE ENERGY: 0
8. MOVING OR SPEAKING SO SLOWLY THAT OTHER PEOPLE COULD HAVE NOTICED. OR THE OPPOSITE, BEING SO FIGETY OR RESTLESS THAT YOU HAVE BEEN MOVING AROUND A LOT MORE THAN USUAL: 0
10. IF YOU CHECKED OFF ANY PROBLEMS, HOW DIFFICULT HAVE THESE PROBLEMS MADE IT FOR YOU TO DO YOUR WORK, TAKE CARE OF THINGS AT HOME, OR GET ALONG WITH OTHER PEOPLE: 0
SUM OF ALL RESPONSES TO PHQ QUESTIONS 1-9: 0
7. TROUBLE CONCENTRATING ON THINGS, SUCH AS READING THE NEWSPAPER OR WATCHING TELEVISION: 0
SUM OF ALL RESPONSES TO PHQ QUESTIONS 1-9: 0

## 2023-02-06 ASSESSMENT — ENCOUNTER SYMPTOMS
CHEST TIGHTNESS: 0
ABDOMINAL DISTENTION: 0
RHINORRHEA: 0
DIARRHEA: 0
CONSTIPATION: 0
SHORTNESS OF BREATH: 0
PHOTOPHOBIA: 0
COUGH: 0
SINUS PRESSURE: 0
VOMITING: 0
SORE THROAT: 0
ABDOMINAL PAIN: 0

## 2023-02-06 NOTE — PROGRESS NOTES
[facility] PRIMARY [facility]ARE  [address] [place] [address]  Dept: [phone]    Neda Carrillo is a 47 y.o. male Established patient, who presents today for his medical conditions/complaints as noted below. Chief Complaint   Patient presents with    Diabetes     3 month follow up       HPI:     HPI: The patient is a pleasant 60-year-old male who presents today with concerns of 3-month follow-up. Patient has a significant past medical history of diabetes, obstructive sleep apnea, COPD, hypertension. Patient currently taking metformin, and is on a statin as well as ACE inhibitor for diabetes. Patient also needs shingles vaccine second dose. Gated follow-up with eye doctor. Had surgery on his thoracic spine doing much better. His COPD is better now but still has SOB. Stopped in 2014. Had inhalers no longer using. Has one at home just in case. ZEFERINO better since he has lost weight and gotten off narcotics. Tossing and turning. No longer using CPAP but he is going to start again. Had first study done in [facility] but we no longer have those results. The patient is tolerating his depression medications well no bad days he is dong much better. Reviewed prior notes None  Reviewed previous Labs    LDL Calculated (mg/dL)   Date Value   08/16/2022 154       (goal LDL is <100)   BUN (mg/dL)   Date Value   01/26/2023 10     Hemoglobin A1C (%)   Date Value   02/06/2023 5.5     BP Readings from Last 3 Encounters:   02/06/23 126/80   01/26/23 120/86   01/11/23 113/76          (goal 120/80)  Hemoglobin A1C   Date Value Ref Range Status   02/06/2023 5.5 % Final     Past Medical History:   Diagnosis Date    Chronic back pain     COPD (chronic obstructive pulmonary disease) ([address]     has not seen a pulmonologist in 2 years.  Has improved with weight loss    Depression     Diabetes mellitus ([address]     Diarrhea     Hyperlipidemia     Hypertension     Lumbar herniated disc     ZEFERINO (obstructive sleep apnea)     does not use cpap    PTSD (post-traumatic stress disorder)     Thoracic myelopathy     Wellness examination 10/2022    Jagdish Mitchell [facility]      Past Surgical History:   Procedure Laterality Date    APPENDECTOMY      JOINT REPLACEMENT Right 2020    knee    LAMINECTOMY  2023    T2-3    LUMBAR SPINE SURGERY N/A 2023    T2-3 LAMINECTOMY [facility]    SEPTOPLASTY         Family History   Problem Relation Age of Onset    COPD Mother     High Blood Pressure Father     Cancer Father        Social History     Tobacco Use    Smoking status: Former     Packs/day: 0.50     Years: 10.00     Pack years: 5.00     Types: Cigarettes     Quit date:      Years since quittin.1    Smokeless tobacco: Never   Substance Use Topics    Alcohol use: Yes     Comment: 2 times a week      Current Outpatient Medications   Medication Sig Dispense Refill    metFORMIN (GLUCOPHAGE) 500 MG tablet TAKE 1 TABLET BY MOUTH IN THE MORNING AND 1 TABLET IN THE EVENING. TAKE WITH MEALS. 60 tablet 1    amLODIPine (NORVASC) 10 MG tablet TAKE 1 TABLET BY MOUTH EVERY DAY IN THE MORNING 30 tablet 1    citalopram (CELEXA) 40 MG tablet TAKE 1 TABLET BY MOUTH EVERY DAY IN THE MORNING 30 tablet 1    lisinopril (PRINIVIL;ZESTRIL) 20 MG tablet TAKE 1 TABLET BY MOUTH EVERY DAY IN THE MORNING 30 tablet 1    loperamide (IMODIUM) 2 MG capsule TAKE 1 CAPSULE BY MOUTH 2 TIMES DAILY AS NEEDED FOR DIARRHEA 60 capsule 0    aspirin 81 MG EC tablet Take 81 mg by mouth daily      atorvastatin (LIPITOR) 20 MG tablet TAKE 1 TABLET BY MOUTH EVERY DAY 30 tablet 5    buPROPion (WELLBUTRIN XL) 150 MG extended release tablet Take 1 tablet by mouth every morning 30 tablet 3     No current facility-administered medications for this visit.     No Known Allergies    Health Maintenance   Topic Date Due    HIV screen  Never done    Diabetic retinal exam  Never done    Shingles  vaccine (2 of 2) 12/28/2022    Hepatitis B vaccine (1 of 3 - Risk 3-dose series) 11/02/2023 (Originally 2/13/1989)    DTaP/Tdap/Td vaccine (1 - Tdap) 11/02/2023 (Originally 2/13/1989)    Pneumococcal 0-64 years Vaccine (1 - PCV) 11/02/2023 (Originally 2[birth year]13/1975)    COVID-19 Vaccine (3 - Booster for Pfizer series) 11/02/2023 (Originally 2/1/2022)    Lipids  08/16/2023    Diabetic foot exam  11/02/2023    Diabetic Alb to Cr ratio (uACR) test  11/02/2023    Depression Monitoring  12/09/2023    GFR test (Diabetes, CKD 3-4, OR last GFR 15-59)  01/26/2024    A1C test (Diabetic or Prediabetic)  02/06/2024    Colorectal Cancer Screen  09/01/2025    Flu vaccine  Completed    Hepatitis C screen  Completed    Hepatitis A vaccine  Aged Out    Hib vaccine  Aged Out    Meningococcal (ACWY) vaccine  Aged Out       Subjective:      Review of Systems   Constitutional:  Negative for chills, fever and unexpected weight change.   HENT:  Negative for congestion, hearing loss, rhinorrhea, sinus pressure and sore throat.    Eyes:  Negative for photophobia and visual disturbance.   Respiratory:  Negative for cough, chest tightness and shortness of breath.    Cardiovascular:  Negative for chest pain, palpitations and leg swelling.   Gastrointestinal:  Negative for abdominal distention, abdominal pain, constipation, diarrhea and vomiting.   Genitourinary:  Negative for decreased urine volume, difficulty urinating, frequency and urgency.   Musculoskeletal:  Negative for arthralgias, gait problem and myalgias.   Skin:  Negative for rash.   Neurological:  Negative for dizziness, weakness, numbness and headaches.   Hematological:  Negative for adenopathy.   Psychiatric/Behavioral:  Positive for dysphoric mood. Negative for sleep disturbance. The patient is not nervous/anxious.      Objective:     /80   Pulse 74   Ht 6' 3\" (1.905 m)   Wt 261 lb 6.4 oz (118.6 kg)   SpO2 94%   BMI 32.67 kg/m²   Physical Exam  Constitutional:        General: He is not in acute distress. Appearance: Normal appearance. He is not ill-appearing. HENT:      Head: Normocephalic and atraumatic. Right Ear: External ear normal.      Left Ear: External ear normal.      Nose: Nose normal.      Mouth/Throat:      Mouth: Mucous membranes are moist.   Neck:      Vascular: No carotid bruit. Cardiovascular:      Rate and Rhythm: Normal rate and regular rhythm. Pulses: Normal pulses. Heart sounds: Normal heart sounds. Pulmonary:      Effort: Pulmonary effort is normal. No respiratory distress. Breath sounds: Normal breath sounds. Abdominal:      General: Bowel sounds are normal. There is no distension. Tenderness: There is no abdominal tenderness. Musculoskeletal:         General: Normal range of motion. Cervical back: Normal range of motion and neck supple. Lymphadenopathy:      Cervical: No cervical adenopathy. Skin:     General: Skin is warm and dry. Neurological:      General: No focal deficit present. Mental Status: He is alert and oriented to person, place, and time. Psychiatric:         Mood and Affect: Mood normal.         Behavior: Behavior normal.         Thought Content: Thought content normal.       Assessment and Plan:          1. Cervical myelopathy ([address]  2. Controlled type 2 diabetes mellitus with complication, without long-term current use of insulin (HCC)  -     POCT glycosylated hemoglobin (Hb A1C)  3. ZEFERINO and COPD overlap syndrome ([address]  -     Sleep Study with PAP Titration; Future  4. Need for vaccination  -     Zoster, SHINGRIX, (18 yrs +), IM  5. Essential (primary) hypertension  6. Chronic obstructive pulmonary disease, unspecified COPD type ([address]  7. Major depressive disorder, recurrent, moderate (HCC)   Continue to follow with orthopedic neurosurgery for cervical myelopathy. Begin using CPAP we will get new titration study to make sure pressures are okay.   Use inhaler as needed keep monitoring shortness of breath with COPD. If this increases in needing rescue inhaler will start on maintenance inhaler for patient. Depression well-controlled on current medication regimen continue as is. Return in about 3 months (around 5/6/2023) for Diabetes. Patient given educational materials - see patient instructions. Discussed use, benefit, and side effects of prescribed medications. All patient questions answered. Pt voiced understanding. Reviewed health maintenance. Instructed to continue current medications, diet and exercise. Patient agreed with treatment plan. Follow up as directed.      Electronically signed by SAMANTA Gar on 2/6/2023 at 11:15 AM

## 2023-02-09 ENCOUNTER — OFFICE VISIT (OUTPATIENT)
Dept: NEUROSURGERY | Age: 53
End: 2023-02-09

## 2023-02-09 VITALS
WEIGHT: 261 LBS | SYSTOLIC BLOOD PRESSURE: 128 MMHG | OXYGEN SATURATION: 94 % | DIASTOLIC BLOOD PRESSURE: 91 MMHG | TEMPERATURE: 97.4 F | HEART RATE: 79 BPM | HEIGHT: 75 IN | BODY MASS INDEX: 32.45 KG/M2

## 2023-02-09 DIAGNOSIS — M47.14 THORACIC MYELOPATHY: Primary | ICD-10-CM

## 2023-02-09 DIAGNOSIS — Z98.890 S/P LAMINECTOMY: ICD-10-CM

## 2023-02-09 PROCEDURE — 99024 POSTOP FOLLOW-UP VISIT: CPT | Performed by: NURSE PRACTITIONER

## 2023-02-09 NOTE — PROGRESS NOTES
[address]  [facility] [address] [facility] [address] [address]  Dept: [phone]    Patient:  Kathrine Clay  YOB: 1970  Date: 2/9/23    The patient is a 47 y.o. male who presents today for consult of the following problems:     Chief Complaint   Patient presents with    Post-Op Check     Patient does not report any pain in relation to post surgery          HPI:     Kathrine Clay is a 47 y.o. male who presents to the office for post-op evaluation s/p T2-T3 decompressive laminectomy. Patient doing very well postoperatively. Minimal to no postop pain. Incision well-healed. Has been increasing activity, working out, walking, using bands. Denies any new numbness, tingling or weakness. Does feel some improvement to preoperative symptoms. Overall very pleased with outcome thus far. Strength 5/5  Sensation intact  Incision well-healed    Date of surgery: 1/25/2023    Assessment and Plan:      1. Thoracic myelopathy    2. S/P laminectomy          Plan: Patient doing very well postoperatively. Does not feel that he needs any physical therapy. Advised to gradually increase physical activity, continue to avoid any heavy lifting, bending or twisting. Next postop visit in March as planned with Dr. Mellissa Sharif. Contact office with any questions or concerns. Followup: Return in about 6 weeks (around 3/23/2023), or if symptoms worsen or fail to improve. Prescriptions Ordered:  No orders of the defined types were placed in this encounter. Orders Placed:  No orders of the defined types were placed in this encounter. Electronically signed by COLTON Meng CNP on 2/9/2023 at 11:08 AM    Please note that this chart was generated using voice recognition Dragon dictation software.   Although every effort was made to ensure the accuracy of this automated transcription, some errors in transcription may have occurred.

## 2023-02-21 DIAGNOSIS — F32.A DEPRESSION, UNSPECIFIED DEPRESSION TYPE: ICD-10-CM

## 2023-02-21 RX ORDER — BUPROPION HYDROCHLORIDE 150 MG/1
TABLET ORAL
Qty: 30 TABLET | Refills: 3 | Status: SHIPPED | OUTPATIENT
Start: 2023-02-21

## 2023-02-21 RX ORDER — LOPERAMIDE HYDROCHLORIDE 2 MG/1
2 CAPSULE ORAL 2 TIMES DAILY PRN
Qty: 60 CAPSULE | Refills: 0 | Status: SHIPPED | OUTPATIENT
Start: 2023-02-21

## 2023-03-18 DIAGNOSIS — I10 ESSENTIAL (PRIMARY) HYPERTENSION: ICD-10-CM

## 2023-03-18 DIAGNOSIS — F41.9 ANXIETY: ICD-10-CM

## 2023-03-18 DIAGNOSIS — E11.8 CONTROLLED TYPE 2 DIABETES MELLITUS WITH COMPLICATION, WITHOUT LONG-TERM CURRENT USE OF INSULIN (HCC): ICD-10-CM

## 2023-03-20 RX ORDER — CITALOPRAM 40 MG/1
TABLET ORAL
Qty: 30 TABLET | Refills: 1 | Status: SHIPPED | OUTPATIENT
Start: 2023-03-20

## 2023-03-20 RX ORDER — AMLODIPINE BESYLATE 10 MG/1
TABLET ORAL
Qty: 30 TABLET | Refills: 1 | Status: SHIPPED | OUTPATIENT
Start: 2023-03-20

## 2023-03-20 RX ORDER — LISINOPRIL 20 MG/1
TABLET ORAL
Qty: 30 TABLET | Refills: 1 | Status: SHIPPED | OUTPATIENT
Start: 2023-03-20

## (undated) DEVICE — DRESSING TRNSPAR W5XL4.5IN FLM SHT SEMIPERMEABLE WIND

## (undated) DEVICE — PACK PROCEDURE SURG LUMBAR SPINE SVMMC

## (undated) DEVICE — SPONGE,NEURO,0.5"X0.5",XR,STRL,10/PK: Brand: MEDLINE

## (undated) DEVICE — GOWN,AURORA,NONREINFORCED,LARGE: Brand: MEDLINE

## (undated) DEVICE — NEEDLE SPNL 18GA L3.5IN W/ QNCKE SHARPER BVL DURA CLICK

## (undated) DEVICE — SYRINGE,CONTROL,LL,FINGER,GRIP: Brand: MEDLINE INDUSTRIES, INC.

## (undated) DEVICE — COVER,MAYO STAND,STERILE: Brand: MEDLINE

## (undated) DEVICE — APPLICATOR MEDICATED 26 CC SOLUTION HI LT ORNG CHLORAPREP

## (undated) DEVICE — SUTURE VCRL SZ 3-0 L18IN ABSRB UD L26MM SH 1/2 CIR J864D

## (undated) DEVICE — SUTURE VCRL SZ 2-0 L18IN ABSRB UD CT-1 L36MM 1/2 CIR J839D

## (undated) DEVICE — GLOVE ORANGE PI 7   MSG9070

## (undated) DEVICE — CONNECTOR TBNG WHT PLAS SUCT STR 5IN1 LTWT W/ M CONN

## (undated) DEVICE — INTENDED FOR TISSUE SEPARATION, AND OTHER PROCEDURES THAT REQUIRE A SHARP SURGICAL BLADE TO PUNCTURE OR CUT.: Brand: BARD-PARKER ® CARBON RIB-BACK BLADES

## (undated) DEVICE — DRESSING TRNSPAR W2XL2.75IN FLM SHT SEMIPERMEABLE WIND

## (undated) DEVICE — TOTAL TRAY, 16FR 10ML SIL FOLEY, URN: Brand: MEDLINE

## (undated) DEVICE — DRAPE MICSCP W117XL305CM DIA65MM LENS W VARI LENS2 FOR LEICA

## (undated) DEVICE — SUTURE STRATAFIX SYMMETRIC PDS + SZ 0 L18IN ABSRB L36MM SXPP1A401

## (undated) DEVICE — GLOVE ORANGE PI 8 1/2   MSG9085

## (undated) DEVICE — ELECTRODE PT RET AD L9FT HI MOIST COND ADH HYDRGEL CORDED

## (undated) DEVICE — SPONGE,NEURO,1"X1",XR,STRL,LF,10/PK: Brand: MEDLINE

## (undated) DEVICE — DRESSING BORDERED ADH GZ UNIV GEN USE 8INX4IN AND 6INX2IN

## (undated) DEVICE — DRESSING BORDERED ADH GZ UNIV GEN USE 5IN 4IN AND 2 1/2IN

## (undated) DEVICE — NEEDLE HYPO 25GA L1.5IN BLU POLYPR HUB S STL REG BVL STR

## (undated) DEVICE — TOWEL,OR,DSP,ST,NATURAL,DLX,4/PK,20PK/CS: Brand: MEDLINE

## (undated) DEVICE — 3M™ IOBAN™ 2 ANTIMICROBIAL INCISE DRAPE 6650EZ: Brand: IOBAN™ 2

## (undated) DEVICE — GOWN,SIRUS,NONRNF,SETINSLV,XL,20/CS: Brand: MEDLINE

## (undated) DEVICE — SUTURE VCRL SZ 0 L18IN ABSRB UD L36MM CT-1 1/2 CIR J840D

## (undated) DEVICE — SHEET, T, LAPAROTOMY, STERILE: Brand: MEDLINE

## (undated) DEVICE — 4.0MM PRECISION ROUND

## (undated) DEVICE — DRAPE,REIN 53X77,STERILE: Brand: MEDLINE

## (undated) DEVICE — GAUZE,SPONGE,FLUFF,6"X6.75",STRL,5/TRAY: Brand: MEDLINE

## (undated) DEVICE — BLADE ES ELASTOMERIC COAT INSUL DURABLE BEND UPTO 90DEG

## (undated) DEVICE — MARKER,SKIN,WI/RULER AND LABELS: Brand: MEDLINE

## (undated) DEVICE — AGENT HEMOSTATIC SURGIFLOW MATRIX KIT W/THROMBIN

## (undated) DEVICE — SUTURE NONABSORBABLE MONOFILAMENT 2-0 FS 18 IN ETHILON 664H

## (undated) DEVICE — ADHESIVE SKIN CLOSURE TOP 36 CC HI VISC DERMBND MINI

## (undated) DEVICE — C-ARM: Brand: UNBRANDED

## (undated) DEVICE — KIT EVAC 0.13IN RECT TB DIA10FR 400CC PVC 3 SPR Y CONN DRN

## (undated) DEVICE — 3.0MM PRECISION NEURO (MATCH HEAD)

## (undated) DEVICE — GLOVE ORANGE PI 7 1/2   MSG9075